# Patient Record
Sex: MALE | Race: BLACK OR AFRICAN AMERICAN
[De-identification: names, ages, dates, MRNs, and addresses within clinical notes are randomized per-mention and may not be internally consistent; named-entity substitution may affect disease eponyms.]

---

## 2017-01-20 ENCOUNTER — PRIOR ORIGINAL RECORDS (OUTPATIENT)
Dept: OTHER | Age: 57
End: 2017-01-20

## 2017-08-28 ENCOUNTER — PRIOR ORIGINAL RECORDS (OUTPATIENT)
Dept: OTHER | Age: 57
End: 2017-08-28

## 2017-09-05 LAB
ALKALINE PHOSPHATATE(ALK PHOS): 63 IU/L
BILIRUBIN TOTAL: 0.9 MG/DL
BUN: 10 MG/DL
CALCIUM: 9.8 MG/DL
CHLORIDE: 105 MEQ/L
CHOLESTEROL, TOTAL: 177 MG/DL
CREATININE, SERUM: 1 MG/DL
GLUCOSE: 105 MG/DL
HDL CHOLESTEROL: 42 MG/DL
HEMATOCRIT: 39.9 %
HEMOGLOBIN A1C: 5.6 %
HEMOGLOBIN: 13.7 G/DL
LDL CHOLESTEROL: 117 MG/DL
PLATELETS: 236 K/UL
POTASSIUM, SERUM: 4.7 MEQ/L
PROTEIN, TOTAL: 6.6 G/DL
RED BLOOD COUNT: 4.4 X 10-6/U
SGOT (AST): 25 IU/L
SGPT (ALT): 20 IU/L
SODIUM: 143 MEQ/L
THYROID STIMULATING HORMONE: 1.25 MLU/L
TRIGLYCERIDES: 92 MG/DL
WHITE BLOOD COUNT: 6.9 X 10-3/U

## 2017-12-07 ENCOUNTER — PRIOR ORIGINAL RECORDS (OUTPATIENT)
Dept: OTHER | Age: 57
End: 2017-12-07

## 2018-12-13 ENCOUNTER — MYAURORA ACCOUNT LINK (OUTPATIENT)
Dept: OTHER | Age: 58
End: 2018-12-13

## 2018-12-13 ENCOUNTER — PRIOR ORIGINAL RECORDS (OUTPATIENT)
Dept: OTHER | Age: 58
End: 2018-12-13

## 2019-02-12 ENCOUNTER — PRIOR ORIGINAL RECORDS (OUTPATIENT)
Dept: OTHER | Age: 59
End: 2019-02-12

## 2019-02-12 ENCOUNTER — HOSPITAL ENCOUNTER (OUTPATIENT)
Dept: CT IMAGING | Age: 59
Discharge: HOME | End: 2019-02-12
Attending: INTERNAL MEDICINE

## 2019-02-12 DIAGNOSIS — Z13.9 ENCOUNTER FOR SCREENING: ICD-10-CM

## 2019-02-15 ENCOUNTER — PRIOR ORIGINAL RECORDS (OUTPATIENT)
Dept: OTHER | Age: 59
End: 2019-02-15

## 2019-02-15 LAB — UFCT: 0 CA SCORE

## 2019-02-28 VITALS
BODY MASS INDEX: 27.7 KG/M2 | HEART RATE: 84 BPM | SYSTOLIC BLOOD PRESSURE: 128 MMHG | HEIGHT: 73 IN | DIASTOLIC BLOOD PRESSURE: 70 MMHG | WEIGHT: 209 LBS

## 2019-02-28 VITALS
DIASTOLIC BLOOD PRESSURE: 80 MMHG | HEIGHT: 73 IN | HEART RATE: 95 BPM | BODY MASS INDEX: 27.57 KG/M2 | WEIGHT: 208 LBS | SYSTOLIC BLOOD PRESSURE: 130 MMHG

## 2019-03-05 ENCOUNTER — OFFICE VISIT (OUTPATIENT)
Dept: FAMILY MEDICINE CLINIC | Facility: CLINIC | Age: 59
End: 2019-03-05
Payer: COMMERCIAL

## 2019-03-05 VITALS
WEIGHT: 206 LBS | DIASTOLIC BLOOD PRESSURE: 80 MMHG | HEIGHT: 72 IN | SYSTOLIC BLOOD PRESSURE: 122 MMHG | OXYGEN SATURATION: 98 % | BODY MASS INDEX: 27.9 KG/M2 | HEART RATE: 102 BPM | TEMPERATURE: 98 F | RESPIRATION RATE: 20 BRPM

## 2019-03-05 DIAGNOSIS — J01.40 ACUTE NON-RECURRENT PANSINUSITIS: Primary | ICD-10-CM

## 2019-03-05 DIAGNOSIS — H66.93 ACUTE BILATERAL OTITIS MEDIA: ICD-10-CM

## 2019-03-05 PROCEDURE — 99202 OFFICE O/P NEW SF 15 MIN: CPT | Performed by: NURSE PRACTITIONER

## 2019-03-05 RX ORDER — FLUTICASONE PROPIONATE 50 MCG
2 SPRAY, SUSPENSION (ML) NASAL DAILY
Qty: 1 BOTTLE | Refills: 0 | Status: SHIPPED | OUTPATIENT
Start: 2019-03-05 | End: 2019-03-19

## 2019-03-05 RX ORDER — AMOXICILLIN AND CLAVULANATE POTASSIUM 875; 125 MG/1; MG/1
1 TABLET, FILM COATED ORAL 2 TIMES DAILY
Qty: 20 TABLET | Refills: 0 | Status: SHIPPED | OUTPATIENT
Start: 2019-03-05 | End: 2019-03-15

## 2019-03-05 NOTE — PATIENT INSTRUCTIONS
-   Increase oral fluids to loosen and thin secretions, eat a nutritious diet  -   Tylenol or ibuprofen for pain as packet insert   -   Robitussin DM, Mucinex DM, or generic equivalent for cough as packet insert  -   Return to clinic if symptoms persist or can also happen due to allergies to pollens and other particles in the air. Sinusitis can cause symptoms of sinus congestion and a feeling of fullness. A sinus infection causes fever, headache, and facial pain.  There is often green or yellow fluid draining medicine was prescribed to you. If you have chronic liver or kidney disease or ever had a stomach ulcer, talk with your healthcare provider before using these medicines. (Aspirin should never be taken by anyone under age 25 who is ill with a fever.  It may treatment. Home care  The following are general care guidelines:  · Finish all of the antibiotic medicine given, even though you may feel better after the first few days.   · You may use over-the-counter medicine, such as acetaminophen or ibuprofen, to c

## 2019-03-05 NOTE — PROGRESS NOTES
CHIEF COMPLAINT:   Patient presents with:  Chest Congestion: post nasal drainage, coughing up flem, bodyaches, headache, sinus pressure, swollen glands mainly on left side, X  4 days      HPI:   Michelle Medina is a 62year old male who presents for sinus co NEURO: + sinus headaches. No numbness or tingling in face.     EXAM:   /80   Pulse 102   Temp 98.2 °F (36.8 °C) (Oral)   Resp 20   Ht 72\"   Wt 206 lb   SpO2 98%   BMI 27.94 kg/m²   GENERAL: well developed, well nourished,in no apparent distress  SKI -   Tylenol or ibuprofen for pain as packet insert   -   Robitussin DM, Mucinex DM, or generic equivalent for cough as packet insert  -   Return to clinic if symptoms persist or worsen in the next 3 days  -   Flonase for nasal symptoms as packet insert The sinuses are air-filled spaces within the bones of the face. They connect to the inside of the nose. Sinusitis is an inflammation of the tissue that lines the sinuses. Sinusitis can occur during a cold.  It can also happen due to allergies to pollens and · Do not use nasal rinses or irrigation during an acute sinus infection, unless your healthcare provider tells you to. Rinsing may spread the infection to other areas in your sinuses.   · Use acetaminophen or ibuprofen to control pain, unless another pain m You have an infection of the middle ear, the space behind the eardrum. This is also called acute otitis media (AOM). Sometimes it is caused by the common cold.  This is because congestion can block the internal passage (eustachian tube) that drains fluid fr The patient indicates understanding of these issues and agrees to the plan.

## 2019-04-19 RX ORDER — LOSARTAN POTASSIUM 50 MG/1
TABLET ORAL
COMMUNITY
End: 2019-07-19 | Stop reason: SDUPTHER

## 2019-06-11 ENCOUNTER — HOSPITAL ENCOUNTER (OUTPATIENT)
Dept: CARDIOLOGY CLINIC | Facility: HOSPITAL | Age: 59
Discharge: HOME | End: 2019-06-11
Attending: INTERNAL MEDICINE

## 2019-06-11 DIAGNOSIS — Z13.9 ENCOUNTER FOR SCREENING: ICD-10-CM

## 2019-06-12 ENCOUNTER — OFFICE VISIT (OUTPATIENT)
Dept: FAMILY MEDICINE CLINIC | Facility: CLINIC | Age: 59
End: 2019-06-12
Payer: COMMERCIAL

## 2019-06-12 VITALS
SYSTOLIC BLOOD PRESSURE: 170 MMHG | DIASTOLIC BLOOD PRESSURE: 98 MMHG | HEIGHT: 71 IN | WEIGHT: 197 LBS | TEMPERATURE: 98 F | OXYGEN SATURATION: 98 % | BODY MASS INDEX: 27.58 KG/M2 | RESPIRATION RATE: 15 BRPM | HEART RATE: 62 BPM

## 2019-06-12 DIAGNOSIS — Z23 NEED FOR TDAP VACCINATION: ICD-10-CM

## 2019-06-12 DIAGNOSIS — Z00.00 ENCOUNTER FOR ANNUAL PHYSICAL EXAM: Primary | ICD-10-CM

## 2019-06-12 DIAGNOSIS — Z13.228 SCREENING FOR ENDOCRINE, NUTRITIONAL, METABOLIC AND IMMUNITY DISORDER: ICD-10-CM

## 2019-06-12 DIAGNOSIS — Z13.0 SCREENING FOR ENDOCRINE, NUTRITIONAL, METABOLIC AND IMMUNITY DISORDER: ICD-10-CM

## 2019-06-12 DIAGNOSIS — Z13.29 SCREENING FOR ENDOCRINE, NUTRITIONAL, METABOLIC AND IMMUNITY DISORDER: ICD-10-CM

## 2019-06-12 DIAGNOSIS — I10 HYPERTENSION, UNCONTROLLED: ICD-10-CM

## 2019-06-12 DIAGNOSIS — Z13.21 SCREENING FOR ENDOCRINE, NUTRITIONAL, METABOLIC AND IMMUNITY DISORDER: ICD-10-CM

## 2019-06-12 DIAGNOSIS — Z12.5 PROSTATE CANCER SCREENING: ICD-10-CM

## 2019-06-12 DIAGNOSIS — Z23 NEED FOR PNEUMOCOCCAL VACCINATION: ICD-10-CM

## 2019-06-12 DIAGNOSIS — L30.9 ECZEMA, UNSPECIFIED TYPE: ICD-10-CM

## 2019-06-12 PROCEDURE — 99396 PREV VISIT EST AGE 40-64: CPT | Performed by: EMERGENCY MEDICINE

## 2019-06-12 PROCEDURE — 90471 IMMUNIZATION ADMIN: CPT | Performed by: EMERGENCY MEDICINE

## 2019-06-12 PROCEDURE — 90732 PPSV23 VACC 2 YRS+ SUBQ/IM: CPT | Performed by: EMERGENCY MEDICINE

## 2019-06-12 PROCEDURE — 90715 TDAP VACCINE 7 YRS/> IM: CPT | Performed by: EMERGENCY MEDICINE

## 2019-06-12 PROCEDURE — 99212 OFFICE O/P EST SF 10 MIN: CPT | Performed by: EMERGENCY MEDICINE

## 2019-06-12 PROCEDURE — 90472 IMMUNIZATION ADMIN EACH ADD: CPT | Performed by: EMERGENCY MEDICINE

## 2019-06-12 RX ORDER — LOSARTAN POTASSIUM 50 MG/1
TABLET ORAL
Refills: 1 | COMMUNITY
Start: 2019-04-21 | End: 2019-06-12

## 2019-06-12 RX ORDER — LOSARTAN POTASSIUM AND HYDROCHLOROTHIAZIDE 25; 100 MG/1; MG/1
1 TABLET ORAL DAILY
Qty: 30 TABLET | Refills: 11 | Status: SHIPPED | OUTPATIENT
Start: 2019-06-12 | End: 2019-06-13 | Stop reason: RX

## 2019-06-12 RX ORDER — TRIAMCINOLONE ACETONIDE 5 MG/G
1 CREAM TOPICAL 2 TIMES DAILY
Qty: 30 G | Refills: 1 | Status: SHIPPED | OUTPATIENT
Start: 2019-06-12 | End: 2019-08-11

## 2019-06-12 NOTE — PATIENT INSTRUCTIONS
Thank you for choosing [facility]  To Do:  FOR PUJA BRADLEY    · Home BP monitoring at home  · Low salt diet  · Stop plain losartan, start combination losartan- hydrochlorothiazide  · Follow up in 2-3 weeks for BP check and rash  · Arrange for c years; yearly fecal occult blood test or fecal immunochemical test; or a stool DNA test as often as your healthcare provider advises; talk with your healthcare provider about which tests are best for you   Depression All men in this age group At routine ex provider 3 doses over 6 months; second dose should be given 1 month after the first dose; the third dose should be given at least 2 months after the second dose and at least 4 months after the first dose   Haemophilus influenzae Type B (HIB) Men at [facility] professional medical care. Always follow your healthcare professional's instructions. What is Atopic Dermatitis? Atopic dermatitis (also called eczema) causes chronic skin irritation. It is often found in infants, teens, and adults.  This diseas [address]. [address] [facility], [address] [place] [address]. All rights reserved. This information is not intended as a substitute for professional medical care. Always follow your healthcare professional's instructions.         Managing Atopic Dermatitis (Ecze stress in your life. · Wear loose-fitting cotton clothing that does not bind or rub your skin. · Avoid contact with wool or other scratchy fabrics. · Use fragrance-free products.   Getting good results  Now that you know more about atopic dermatitis, the

## 2019-06-12 NOTE — PROGRESS NOTES
Chief Complaint:   Patient presents with:  Physical: NP, annual physical. C/o rash on hands and underarm    HPI:   This is a 62year old male who present for a yearly annual exam    WELL-MALE EXAM     1. Age:   62   2.   Have you had any of the following Performed by Hardeep Silveira MD at [address]  N/A 7/17/2014    Performed by Hardeep Silveira MD at [address]  N/A 6/26/2014    Performed by Hardeep Silveira MD at [place] [address] [employer] BS. No palpable masses no guarding rigidity no rebound tenderness    : bilateral testicles descended, circumcised no evidence for hernia bilaterally with Valsalva maneuver no penile discharge no rash no inguinal lymphadenopathy.  Sc vaccination  - TETANUS, DIPHTHERIA TOXOIDS AND ACELLULAR PERTUSIS VACCINE (TDAP), >7 YEARS, IM USE    7.  Need for pneumococcal vaccination  - PNEUMOCOCCAL IMM (PNEUMOVAX)          PATIENT INSTRUCTIONS:     · Home BP monitoring at home  · Low salt diet  · S

## 2019-06-13 ENCOUNTER — TELEPHONE (OUTPATIENT)
Dept: FAMILY MEDICINE CLINIC | Facility: CLINIC | Age: 59
End: 2019-06-13

## 2019-06-13 ENCOUNTER — LAB ENCOUNTER (OUTPATIENT)
Dept: LAB | Age: 59
End: 2019-06-13
Attending: EMERGENCY MEDICINE
Payer: COMMERCIAL

## 2019-06-13 DIAGNOSIS — Z13.29 SCREENING FOR ENDOCRINE, NUTRITIONAL, METABOLIC AND IMMUNITY DISORDER: ICD-10-CM

## 2019-06-13 DIAGNOSIS — Z13.228 SCREENING FOR ENDOCRINE, NUTRITIONAL, METABOLIC AND IMMUNITY DISORDER: ICD-10-CM

## 2019-06-13 DIAGNOSIS — Z12.5 PROSTATE CANCER SCREENING: ICD-10-CM

## 2019-06-13 DIAGNOSIS — Z13.0 SCREENING FOR ENDOCRINE, NUTRITIONAL, METABOLIC AND IMMUNITY DISORDER: ICD-10-CM

## 2019-06-13 DIAGNOSIS — Z13.21 SCREENING FOR ENDOCRINE, NUTRITIONAL, METABOLIC AND IMMUNITY DISORDER: ICD-10-CM

## 2019-06-13 DIAGNOSIS — I10 HYPERTENSION, UNCONTROLLED: ICD-10-CM

## 2019-06-13 PROCEDURE — 80050 GENERAL HEALTH PANEL: CPT | Performed by: EMERGENCY MEDICINE

## 2019-06-13 PROCEDURE — 36415 COLL VENOUS BLD VENIPUNCTURE: CPT | Performed by: EMERGENCY MEDICINE

## 2019-06-13 PROCEDURE — 84153 ASSAY OF PSA TOTAL: CPT | Performed by: EMERGENCY MEDICINE

## 2019-06-13 PROCEDURE — 80061 LIPID PANEL: CPT | Performed by: EMERGENCY MEDICINE

## 2019-06-13 RX ORDER — LOSARTAN POTASSIUM 100 MG/1
100 TABLET ORAL DAILY
Qty: 30 TABLET | Refills: 11 | Status: SHIPPED | OUTPATIENT
Start: 2019-06-13 | End: 2020-06-05

## 2019-06-13 RX ORDER — HYDROCHLOROTHIAZIDE 25 MG/1
25 TABLET ORAL DAILY
Qty: 30 TABLET | Refills: 11 | Status: SHIPPED | OUTPATIENT
Start: 2019-06-13 | End: 2020-06-05

## 2019-06-13 NOTE — TELEPHONE ENCOUNTER
Losartan-HCTZ is on backorder. Medication is available if it is spilt. Okay to send individually? Please advise. Thank you!

## 2019-06-18 ENCOUNTER — TELEPHONE (OUTPATIENT)
Dept: CARDIOLOGY | Age: 59
End: 2019-06-18

## 2019-06-19 ENCOUNTER — TELEPHONE (OUTPATIENT)
Dept: FAMILY MEDICINE CLINIC | Facility: CLINIC | Age: 59
End: 2019-06-19

## 2019-06-19 NOTE — TELEPHONE ENCOUNTER
----- Message from Roel Cruz MD sent at 6/18/2019  6:33 PM CDT -----  Stable labs  Follow up as directed

## 2019-06-21 ENCOUNTER — TELEPHONE (OUTPATIENT)
Dept: CARDIOLOGY | Age: 59
End: 2019-06-21

## 2019-06-21 DIAGNOSIS — I70.0 ATHEROSCLEROSIS OF AORTA (CMD): Primary | ICD-10-CM

## 2019-06-21 DIAGNOSIS — E78.00 HYPERCHOLESTEREMIA: ICD-10-CM

## 2019-06-21 RX ORDER — ATORVASTATIN CALCIUM 10 MG/1
10 TABLET, FILM COATED ORAL DAILY
COMMUNITY
End: 2019-06-21 | Stop reason: SDUPTHER

## 2019-06-21 RX ORDER — ATORVASTATIN CALCIUM 10 MG/1
10 TABLET, FILM COATED ORAL DAILY
Qty: 90 TABLET | Refills: 1 | Status: SHIPPED | OUTPATIENT
Start: 2019-06-21 | End: 2020-02-01 | Stop reason: SDUPTHER

## 2019-06-24 PROBLEM — L30.9 ECZEMA: Status: ACTIVE | Noted: 2019-06-24

## 2019-06-24 PROBLEM — I10 HYPERTENSION, UNCONTROLLED: Status: ACTIVE | Noted: 2019-06-24

## 2019-07-19 RX ORDER — LOSARTAN POTASSIUM 50 MG/1
TABLET ORAL
Qty: 90 TABLET | Refills: 1 | Status: SHIPPED | OUTPATIENT
Start: 2019-07-19 | End: 2019-12-03 | Stop reason: DRUGHIGH

## 2019-08-11 DIAGNOSIS — L30.9 ECZEMA, UNSPECIFIED TYPE: ICD-10-CM

## 2019-08-12 RX ORDER — TRIAMCINOLONE ACETONIDE 5 MG/G
CREAM TOPICAL
Qty: 30 G | Refills: 0 | Status: SHIPPED | OUTPATIENT
Start: 2019-08-12 | End: 2020-01-02

## 2019-08-12 NOTE — TELEPHONE ENCOUNTER
Medication(s) to Refill:   Requested Prescriptions     Pending Prescriptions Disp Refills   • TRIAMCINOLONE ACETONIDE 0.5 % External Cream [Pharmacy Med Name: TRIAMCINOLONE 0.5% CREAM 15GM] 30 g 0     Sig: APPLY EXTERNALLY TO THE AFFECTED AREA TWICE DAILY.

## 2019-10-06 DIAGNOSIS — L30.9 ECZEMA, UNSPECIFIED TYPE: ICD-10-CM

## 2019-10-07 RX ORDER — TRIAMCINOLONE ACETONIDE 5 MG/G
CREAM TOPICAL
Qty: 30 G | Refills: 0 | Status: SHIPPED | OUTPATIENT
Start: 2019-10-07 | End: 2019-12-06

## 2019-10-23 ENCOUNTER — TELEPHONE (OUTPATIENT)
Dept: CARDIOLOGY | Age: 59
End: 2019-10-23

## 2019-12-05 ENCOUNTER — OFFICE VISIT (OUTPATIENT)
Dept: CARDIOLOGY | Age: 59
End: 2019-12-05

## 2019-12-05 VITALS
HEART RATE: 88 BPM | DIASTOLIC BLOOD PRESSURE: 60 MMHG | BODY MASS INDEX: 27.3 KG/M2 | WEIGHT: 206 LBS | HEIGHT: 73 IN | SYSTOLIC BLOOD PRESSURE: 110 MMHG

## 2019-12-05 DIAGNOSIS — I10 ESSENTIAL HYPERTENSION: ICD-10-CM

## 2019-12-05 DIAGNOSIS — Z71.6 TOBACCO ABUSE COUNSELING: ICD-10-CM

## 2019-12-05 DIAGNOSIS — E78.2 MIXED HYPERLIPIDEMIA: Primary | ICD-10-CM

## 2019-12-05 DIAGNOSIS — Z72.0 TOBACCO ABUSE: ICD-10-CM

## 2019-12-05 PROCEDURE — 3074F SYST BP LT 130 MM HG: CPT | Performed by: INTERNAL MEDICINE

## 2019-12-05 PROCEDURE — 3078F DIAST BP <80 MM HG: CPT | Performed by: INTERNAL MEDICINE

## 2019-12-05 PROCEDURE — 99214 OFFICE O/P EST MOD 30 MIN: CPT | Performed by: INTERNAL MEDICINE

## 2019-12-05 RX ORDER — HYDROCHLOROTHIAZIDE 25 MG/1
25 TABLET ORAL DAILY
Refills: 11 | COMMUNITY
Start: 2019-11-09

## 2019-12-05 RX ORDER — TRIAMCINOLONE ACETONIDE 5 MG/G
CREAM TOPICAL
Refills: 0 | COMMUNITY
Start: 2019-10-07 | End: 2022-03-11 | Stop reason: ALTCHOICE

## 2019-12-05 RX ORDER — MULTIVITAMIN
TABLET ORAL
COMMUNITY

## 2019-12-05 RX ORDER — LOSARTAN POTASSIUM 100 MG/1
100 TABLET ORAL DAILY
COMMUNITY
End: 2020-12-22 | Stop reason: SDUPTHER

## 2019-12-05 ASSESSMENT — PATIENT HEALTH QUESTIONNAIRE - PHQ9
SUM OF ALL RESPONSES TO PHQ9 QUESTIONS 1 AND 2: 0
1. LITTLE INTEREST OR PLEASURE IN DOING THINGS: NOT AT ALL
2. FEELING DOWN, DEPRESSED OR HOPELESS: NOT AT ALL
SUM OF ALL RESPONSES TO PHQ9 QUESTIONS 1 AND 2: 0

## 2019-12-05 ASSESSMENT — ENCOUNTER SYMPTOMS
HEMATOCHEZIA: 0
ALLERGIC/IMMUNOLOGIC COMMENTS: NO NEW FOOD ALLERGIES
FEVER: 0
BRUISES/BLEEDS EASILY: 0
CHILLS: 0
WEIGHT LOSS: 0
SUSPICIOUS LESIONS: 0
HEMOPTYSIS: 0
COUGH: 0
WEIGHT GAIN: 0

## 2019-12-06 DIAGNOSIS — L30.9 ECZEMA, UNSPECIFIED TYPE: ICD-10-CM

## 2019-12-07 RX ORDER — TRIAMCINOLONE ACETONIDE 5 MG/G
CREAM TOPICAL
Qty: 30 G | Refills: 0 | Status: SHIPPED | OUTPATIENT
Start: 2019-12-07 | End: 2020-01-02

## 2019-12-23 PROBLEM — Z71.6 TOBACCO ABUSE COUNSELING: Status: ACTIVE | Noted: 2019-12-05

## 2019-12-23 PROBLEM — E78.2 MIXED HYPERLIPIDEMIA: Status: ACTIVE | Noted: 2019-12-05

## 2019-12-23 PROBLEM — I10 ESSENTIAL HYPERTENSION: Status: ACTIVE | Noted: 2019-06-24

## 2019-12-23 PROBLEM — Z72.0 TOBACCO ABUSE: Status: ACTIVE | Noted: 2019-12-05

## 2020-01-02 ENCOUNTER — HOSPITAL ENCOUNTER (OUTPATIENT)
Dept: GENERAL RADIOLOGY | Age: 60
Discharge: HOME | End: 2020-01-02
Attending: EMERGENCY MEDICINE
Payer: COMMERCIAL

## 2020-01-02 ENCOUNTER — LAB ENCOUNTER (OUTPATIENT)
Dept: LAB | Age: 60
End: 2020-01-02
Attending: EMERGENCY MEDICINE
Payer: COMMERCIAL

## 2020-01-02 ENCOUNTER — OFFICE VISIT (OUTPATIENT)
Dept: FAMILY MEDICINE CLINIC | Facility: CLINIC | Age: 60
End: 2020-01-02
Payer: COMMERCIAL

## 2020-01-02 VITALS
HEIGHT: 71 IN | BODY MASS INDEX: 28.98 KG/M2 | RESPIRATION RATE: 15 BRPM | DIASTOLIC BLOOD PRESSURE: 78 MMHG | OXYGEN SATURATION: 97 % | HEART RATE: 103 BPM | SYSTOLIC BLOOD PRESSURE: 110 MMHG | WEIGHT: 207 LBS

## 2020-01-02 DIAGNOSIS — Z23 NEED FOR VACCINATION: ICD-10-CM

## 2020-01-02 DIAGNOSIS — J30.9 ALLERGIC RHINITIS, UNSPECIFIED SEASONALITY, UNSPECIFIED TRIGGER: ICD-10-CM

## 2020-01-02 DIAGNOSIS — R06.02 SHORTNESS OF BREATH: ICD-10-CM

## 2020-01-02 DIAGNOSIS — L30.9 ECZEMA, UNSPECIFIED TYPE: ICD-10-CM

## 2020-01-02 DIAGNOSIS — I10 ESSENTIAL HYPERTENSION: Primary | ICD-10-CM

## 2020-01-02 DIAGNOSIS — H10.13 ALLERGIC CONJUNCTIVITIS OF BOTH EYES: ICD-10-CM

## 2020-01-02 LAB
ANION GAP SERPL CALC-SCNC: 3 MMOL/L (ref 0–18)
BASOPHILS # BLD AUTO: 0.03 X10(3) UL (ref 0–0.2)
BASOPHILS NFR BLD AUTO: 0.3 %
BUN BLD-MCNC: 14 MG/DL (ref 7–18)
BUN/CREAT SERPL: 11.5 (ref 10–20)
CALCIUM BLD-MCNC: 9.3 MG/DL (ref 8.5–10.1)
CHLORIDE SERPL-SCNC: 109 MMOL/L (ref 98–112)
CO2 SERPL-SCNC: 30 MMOL/L (ref 21–32)
CREAT BLD-MCNC: 1.22 MG/DL (ref 0.7–1.3)
DEPRECATED RDW RBC AUTO: 41.7 FL (ref 35.1–46.3)
EOSINOPHIL # BLD AUTO: 0.3 X10(3) UL (ref 0–0.7)
EOSINOPHIL NFR BLD AUTO: 3 %
ERYTHROCYTE [DISTWIDTH] IN BLOOD BY AUTOMATED COUNT: 12.6 % (ref 11–15)
GLUCOSE BLD-MCNC: 92 MG/DL (ref 70–99)
HCT VFR BLD AUTO: 41.4 % (ref 39–53)
HGB BLD-MCNC: 13.7 G/DL (ref 13–17.5)
IMM GRANULOCYTES # BLD AUTO: 0.04 X10(3) UL (ref 0–1)
IMM GRANULOCYTES NFR BLD: 0.4 %
LYMPHOCYTES # BLD AUTO: 2.08 X10(3) UL (ref 1–4)
LYMPHOCYTES NFR BLD AUTO: 21.1 %
MCH RBC QN AUTO: 30.5 PG (ref 26–34)
MCHC RBC AUTO-ENTMCNC: 33.1 G/DL (ref 31–37)
MCV RBC AUTO: 92.2 FL (ref 80–100)
MONOCYTES # BLD AUTO: 0.67 X10(3) UL (ref 0.1–1)
MONOCYTES NFR BLD AUTO: 6.8 %
NEUTROPHILS # BLD AUTO: 6.75 X10 (3) UL (ref 1.5–7.7)
NEUTROPHILS # BLD AUTO: 6.75 X10(3) UL (ref 1.5–7.7)
NEUTROPHILS NFR BLD AUTO: 68.4 %
OSMOLALITY SERPL CALC.SUM OF ELEC: 294 MOSM/KG (ref 275–295)
PATIENT FASTING Y/N/NP: YES
PLATELET # BLD AUTO: 271 10(3)UL (ref 150–450)
POTASSIUM SERPL-SCNC: 3.9 MMOL/L (ref 3.5–5.1)
RBC # BLD AUTO: 4.49 X10(6)UL (ref 4.3–5.7)
SODIUM SERPL-SCNC: 142 MMOL/L (ref 136–145)
WBC # BLD AUTO: 9.9 X10(3) UL (ref 4–11)

## 2020-01-02 PROCEDURE — 90686 IIV4 VACC NO PRSV 0.5 ML IM: CPT | Performed by: EMERGENCY MEDICINE

## 2020-01-02 PROCEDURE — 71046 X-RAY EXAM CHEST 2 VIEWS: CPT | Performed by: EMERGENCY MEDICINE

## 2020-01-02 PROCEDURE — 90471 IMMUNIZATION ADMIN: CPT | Performed by: EMERGENCY MEDICINE

## 2020-01-02 PROCEDURE — 80048 BASIC METABOLIC PNL TOTAL CA: CPT | Performed by: EMERGENCY MEDICINE

## 2020-01-02 PROCEDURE — 93000 ELECTROCARDIOGRAM COMPLETE: CPT | Performed by: EMERGENCY MEDICINE

## 2020-01-02 PROCEDURE — 36415 COLL VENOUS BLD VENIPUNCTURE: CPT | Performed by: EMERGENCY MEDICINE

## 2020-01-02 PROCEDURE — 99214 OFFICE O/P EST MOD 30 MIN: CPT | Performed by: EMERGENCY MEDICINE

## 2020-01-02 PROCEDURE — 85025 COMPLETE CBC W/AUTO DIFF WBC: CPT | Performed by: EMERGENCY MEDICINE

## 2020-01-02 RX ORDER — ATORVASTATIN CALCIUM 10 MG/1
10 TABLET, FILM COATED ORAL
COMMUNITY
Start: 2019-06-21

## 2020-01-02 RX ORDER — FLUTICASONE PROPIONATE 50 MCG
2 SPRAY, SUSPENSION (ML) NASAL DAILY
Qty: 16 G | Refills: 2 | Status: SHIPPED | OUTPATIENT
Start: 2020-01-02 | End: 2020-02-01

## 2020-01-02 RX ORDER — TRIAMCINOLONE ACETONIDE 5 MG/G
CREAM TOPICAL
Qty: 30 G | Refills: 2 | Status: SHIPPED | OUTPATIENT
Start: 2020-01-02 | End: 2020-09-08

## 2020-01-02 RX ORDER — OLOPATADINE HYDROCHLORIDE 1 MG/ML
1 SOLUTION/ DROPS OPHTHALMIC 2 TIMES DAILY
Qty: 5 ML | Refills: 1 | Status: SHIPPED | OUTPATIENT
Start: 2020-01-02

## 2020-01-02 NOTE — PATIENT INSTRUCTIONS
Thank you for choosing [facility]  To Do:  FOR PUJA BRADLEY    · Continue with BP meds  · Follow up in 6 months, June for annual physical  · BP checks at home 2-3 x a week  · Continue with Triamcinolone cream as needed  · Use flonase for nasal a

## 2020-01-02 NOTE — PROGRESS NOTES
Chief Complaint:   Patient presents with:  Blood Pressure: F/u     HPI:   This is a 62year old male     HYPERTENSION  On Losartan 100 mg/ HCTZ 25. No Cough.  Compliant with med  Also on Atorvastatin 10 mg daily      SMOKER  Still smoking  3-4 a day for m Disp: 30 tablet, Rfl: 11  hydrochlorothiazide 25 MG Oral Tab, Take 1 tablet (25 mg total) by mouth daily. , Disp: 30 tablet, Rfl: 11  Multiple Vitamin (MULTI-VITAMIN DAILY) Oral Tab, Take by mouth., Disp: , Rfl:     No current facility-administered medicati 1. Essential hypertension  Well-controlled today okay to continue with combination losartan and hydrochlorothiazide. Patient complains of nonspecific symptoms of shortness of breath with standing up from a bending position.   Will check stress test

## 2020-02-04 RX ORDER — ATORVASTATIN CALCIUM 10 MG/1
10 TABLET, FILM COATED ORAL DAILY
Qty: 90 TABLET | Refills: 3 | Status: SHIPPED | OUTPATIENT
Start: 2020-02-04 | End: 2021-02-05 | Stop reason: SDUPTHER

## 2020-02-14 ENCOUNTER — HOSPITAL ENCOUNTER (OUTPATIENT)
Dept: CV DIAGNOSTICS | Age: 60
Discharge: HOME | End: 2020-02-14
Attending: EMERGENCY MEDICINE
Payer: COMMERCIAL

## 2020-02-14 DIAGNOSIS — I10 ESSENTIAL HYPERTENSION: ICD-10-CM

## 2020-02-14 DIAGNOSIS — R06.02 SHORTNESS OF BREATH: ICD-10-CM

## 2020-02-14 PROCEDURE — 93017 CV STRESS TEST TRACING ONLY: CPT | Performed by: EMERGENCY MEDICINE

## 2020-02-14 PROCEDURE — 93018 CV STRESS TEST I&R ONLY: CPT | Performed by: EMERGENCY MEDICINE

## 2020-02-17 ENCOUNTER — TELEPHONE (OUTPATIENT)
Dept: FAMILY MEDICINE CLINIC | Facility: CLINIC | Age: 60
End: 2020-02-17

## 2020-02-17 NOTE — TELEPHONE ENCOUNTER
CARD TREADMILL STRESS, ADULT   Notes recorded by Faiza Negrete MD on 2/16/2020 at 10:21 PM CST  Normal stress test  Pt needs OV if with persistent Sx

## 2020-06-05 ENCOUNTER — OFFICE VISIT (OUTPATIENT)
Dept: FAMILY MEDICINE CLINIC | Facility: CLINIC | Age: 60
End: 2020-06-05
Payer: COMMERCIAL

## 2020-06-05 VITALS
BODY MASS INDEX: 26.64 KG/M2 | HEIGHT: 73 IN | TEMPERATURE: 98 F | RESPIRATION RATE: 16 BRPM | SYSTOLIC BLOOD PRESSURE: 118 MMHG | DIASTOLIC BLOOD PRESSURE: 76 MMHG | HEART RATE: 85 BPM | WEIGHT: 201 LBS | OXYGEN SATURATION: 95 %

## 2020-06-05 DIAGNOSIS — Z13.0 SCREENING FOR ENDOCRINE, NUTRITIONAL, METABOLIC AND IMMUNITY DISORDER: ICD-10-CM

## 2020-06-05 DIAGNOSIS — E78.2 MIXED HYPERLIPIDEMIA: ICD-10-CM

## 2020-06-05 DIAGNOSIS — L30.9 ECZEMA, UNSPECIFIED TYPE: ICD-10-CM

## 2020-06-05 DIAGNOSIS — Z13.228 SCREENING FOR ENDOCRINE, NUTRITIONAL, METABOLIC AND IMMUNITY DISORDER: ICD-10-CM

## 2020-06-05 DIAGNOSIS — Z00.00 ANNUAL PHYSICAL EXAM: Primary | ICD-10-CM

## 2020-06-05 DIAGNOSIS — Z13.29 SCREENING FOR ENDOCRINE, NUTRITIONAL, METABOLIC AND IMMUNITY DISORDER: ICD-10-CM

## 2020-06-05 DIAGNOSIS — Z12.5 PROSTATE CANCER SCREENING: ICD-10-CM

## 2020-06-05 DIAGNOSIS — I10 ESSENTIAL HYPERTENSION: ICD-10-CM

## 2020-06-05 DIAGNOSIS — Z12.11 SCREENING FOR COLON CANCER: ICD-10-CM

## 2020-06-05 DIAGNOSIS — Z13.21 SCREENING FOR ENDOCRINE, NUTRITIONAL, METABOLIC AND IMMUNITY DISORDER: ICD-10-CM

## 2020-06-05 PROCEDURE — 99396 PREV VISIT EST AGE 40-64: CPT | Performed by: EMERGENCY MEDICINE

## 2020-06-05 RX ORDER — LOSARTAN POTASSIUM 100 MG/1
100 TABLET ORAL DAILY
Qty: 90 TABLET | Refills: 1 | Status: SHIPPED | OUTPATIENT
Start: 2020-06-05 | End: 2020-07-01

## 2020-06-05 RX ORDER — HYDROCHLOROTHIAZIDE 25 MG/1
25 TABLET ORAL DAILY
Qty: 90 TABLET | Refills: 1 | Status: SHIPPED | OUTPATIENT
Start: 2020-06-05 | End: 2021-04-26

## 2020-06-05 NOTE — PROGRESS NOTES
Chief Complaint:   Patient presents with: Follow - Up: BP and rash     HPI:   This is a 56year old male who present for a yearly annual exam    WELL-MALE EXAM       1. Age:              56   2.   Have you had any of the following problems:          a. Past Surgical History:   Procedure Laterality Date   • CERVICAL EPIDURAL N/A 8/7/2014    Performed by Erlinda Knight MD at [address] [facility] N/A 7/17/2014    Performed by Erlinda Knight MD at [address] [facility] distress  SKIN: good skin turgor, no obvious rashes  HEENT: atraumatic, normocephalic, ears, nose and throat are clear  EYES: sclera non icteric bilateral  NECK: supple, no adenopathy, no thyromegaly  LUNGS: clear to auscultation, no RRW  CARDIO: RRR witho healthy weight and healthy BMI  Immunizations reviewed and updated  TDAP up to date  The patient indicates understanding of these issues and agrees to the plan.   The patient is asked  to return yearly for annual preventative health exam.  Tetanus, Diptheri

## 2020-06-05 NOTE — PATIENT INSTRUCTIONS
Thank you for choosing [facility]  To Do:  FOR PUJA BRADLEY    · Arrange for colonoscopy, Dr. Marta Muniz  · Follow up in 6 months   · Flu shot in the fall  · Continue with all meds  · Monitor BP at home once a week  · Have blood tests done after fast stool DNA test as often as your healthcare provider advises; talk with your healthcare provider about which tests are best for you   Depression All men in this age group At routine exams   Type 2 diabetes or prediabetes All adults beginning at age 39 and a the first dose; the third dose should be given at least 2 months after the second dose and at least 4 months after the first dose   Haemophilus influenzae Type B (HIB) Men at increased risk for infection – talk with your healthcare provider 1 to 3 doses instructions.

## 2020-06-11 ENCOUNTER — LAB ENCOUNTER (OUTPATIENT)
Dept: LAB | Age: 60
End: 2020-06-11
Attending: EMERGENCY MEDICINE
Payer: COMMERCIAL

## 2020-06-11 DIAGNOSIS — Z13.21 SCREENING FOR ENDOCRINE, NUTRITIONAL, METABOLIC AND IMMUNITY DISORDER: ICD-10-CM

## 2020-06-11 DIAGNOSIS — Z13.29 SCREENING FOR ENDOCRINE, NUTRITIONAL, METABOLIC AND IMMUNITY DISORDER: ICD-10-CM

## 2020-06-11 DIAGNOSIS — Z12.5 PROSTATE CANCER SCREENING: ICD-10-CM

## 2020-06-11 DIAGNOSIS — Z13.0 SCREENING FOR ENDOCRINE, NUTRITIONAL, METABOLIC AND IMMUNITY DISORDER: ICD-10-CM

## 2020-06-11 DIAGNOSIS — Z13.228 SCREENING FOR ENDOCRINE, NUTRITIONAL, METABOLIC AND IMMUNITY DISORDER: ICD-10-CM

## 2020-06-11 PROCEDURE — 36415 COLL VENOUS BLD VENIPUNCTURE: CPT | Performed by: EMERGENCY MEDICINE

## 2020-06-11 PROCEDURE — 80061 LIPID PANEL: CPT | Performed by: EMERGENCY MEDICINE

## 2020-06-11 PROCEDURE — 80050 GENERAL HEALTH PANEL: CPT | Performed by: EMERGENCY MEDICINE

## 2020-06-11 PROCEDURE — 84153 ASSAY OF PSA TOTAL: CPT | Performed by: EMERGENCY MEDICINE

## 2020-07-01 DIAGNOSIS — I10 ESSENTIAL HYPERTENSION: ICD-10-CM

## 2020-07-01 RX ORDER — LOSARTAN POTASSIUM 100 MG/1
TABLET ORAL
Qty: 30 TABLET | Refills: 0 | Status: SHIPPED | OUTPATIENT
Start: 2020-07-01 | End: 2021-04-26

## 2020-08-10 ENCOUNTER — VIRTUAL PHONE E/M (OUTPATIENT)
Dept: FAMILY MEDICINE CLINIC | Facility: CLINIC | Age: 60
End: 2020-08-10
Payer: COMMERCIAL

## 2020-08-10 DIAGNOSIS — R21 RASH AND NONSPECIFIC SKIN ERUPTION: Primary | ICD-10-CM

## 2020-08-10 PROCEDURE — 99213 OFFICE O/P EST LOW 20 MIN: CPT | Performed by: EMERGENCY MEDICINE

## 2020-08-10 NOTE — PROGRESS NOTES
This is a telemedicine visit with live, interactive audio. Keith Winslow verbally consents to a Virtual/Telephone Check-In visit on 08/10/20.     Patient understands and accepts financial responsibility for any deductible, co-insurance and/or co-pay days consecutively 30 g 0   • LOSARTAN 100 MG Oral Tab TAKE 1 TABLET(100 MG) BY MOUTH DAILY 30 tablet 0   • hydrochlorothiazide 25 MG Oral Tab Take 1 tablet (25 mg total) by mouth daily. 90 tablet 1   • atorvastatin 10 MG Oral Tab Take 10 mg by mouth. adequate time. This billing visit was spent on reviewing labs, medications, radiology tests and decision making. Appropriate medical decision-making and tests are ordered as detailed in the plan of care below.        Rivka Lopez MD

## 2020-08-30 DIAGNOSIS — R21 RASH AND NONSPECIFIC SKIN ERUPTION: ICD-10-CM

## 2020-09-05 DIAGNOSIS — L30.9 ECZEMA, UNSPECIFIED TYPE: ICD-10-CM

## 2020-09-08 RX ORDER — TRIAMCINOLONE ACETONIDE 5 MG/G
CREAM TOPICAL
Qty: 30 G | Refills: 2 | Status: SHIPPED | OUTPATIENT
Start: 2020-09-08 | End: 2020-10-26 | Stop reason: ALTCHOICE

## 2020-09-08 NOTE — TELEPHONE ENCOUNTER
Medication(s) to Refill:   Requested Prescriptions     Pending Prescriptions Disp Refills   • triamcinolone acetonide 0.5 % External Cream [Pharmacy Med Name: TRIAMCINOLONE 0.5% CREAM 15GM] 30 g 2     Sig: APPLY EXTERNALLY TO THE AFFECTED AREA TWICE DAILY.

## 2020-09-12 ENCOUNTER — HOSPITAL ENCOUNTER (EMERGENCY)
Age: 60
Discharge: HOME | End: 2020-09-12
Attending: EMERGENCY MEDICINE
Payer: COMMERCIAL

## 2020-09-12 VITALS
DIASTOLIC BLOOD PRESSURE: 83 MMHG | HEART RATE: 64 BPM | BODY MASS INDEX: 27 KG/M2 | WEIGHT: 201.06 LBS | OXYGEN SATURATION: 96 % | SYSTOLIC BLOOD PRESSURE: 156 MMHG | RESPIRATION RATE: 16 BRPM | TEMPERATURE: 97 F

## 2020-09-12 DIAGNOSIS — H57.11 EYE PAIN, RIGHT: ICD-10-CM

## 2020-09-12 DIAGNOSIS — R51.9 HEADACHE DISORDER: Primary | ICD-10-CM

## 2020-09-12 LAB
ALBUMIN SERPL-MCNC: 3.7 G/DL (ref 3.4–5)
ALBUMIN/GLOB SERPL: 1.2 {RATIO} (ref 1–2)
ALP LIVER SERPL-CCNC: 70 U/L (ref 45–117)
ALT SERPL-CCNC: 22 U/L (ref 16–61)
ANION GAP SERPL CALC-SCNC: 6 MMOL/L (ref 0–18)
AST SERPL-CCNC: 20 U/L (ref 15–37)
BASOPHILS # BLD AUTO: 0.03 X10(3) UL (ref 0–0.2)
BASOPHILS NFR BLD AUTO: 0.4 %
BILIRUB SERPL-MCNC: 1.2 MG/DL (ref 0.1–2)
BUN BLD-MCNC: 12 MG/DL (ref 7–18)
BUN/CREAT SERPL: 12.9 (ref 10–20)
CALCIUM BLD-MCNC: 8.8 MG/DL (ref 8.5–10.1)
CHLORIDE SERPL-SCNC: 107 MMOL/L (ref 98–112)
CO2 SERPL-SCNC: 27 MMOL/L (ref 21–32)
CREAT BLD-MCNC: 0.93 MG/DL (ref 0.7–1.3)
CRP SERPL-MCNC: 0.3 MG/DL (ref ?–0.3)
DEPRECATED RDW RBC AUTO: 43.4 FL (ref 35.1–46.3)
EOSINOPHIL # BLD AUTO: 0.14 X10(3) UL (ref 0–0.7)
EOSINOPHIL NFR BLD AUTO: 1.7 %
ERYTHROCYTE [DISTWIDTH] IN BLOOD BY AUTOMATED COUNT: 12.9 % (ref 11–15)
GLOBULIN PLAS-MCNC: 3.2 G/DL (ref 2.8–4.4)
GLUCOSE BLD-MCNC: 112 MG/DL (ref 70–99)
HCT VFR BLD AUTO: 39.3 % (ref 39–53)
HGB BLD-MCNC: 13.2 G/DL (ref 13–17.5)
IMM GRANULOCYTES # BLD AUTO: 0.02 X10(3) UL (ref 0–1)
IMM GRANULOCYTES NFR BLD: 0.2 %
LYMPHOCYTES # BLD AUTO: 1.34 X10(3) UL (ref 1–4)
LYMPHOCYTES NFR BLD AUTO: 16 %
M PROTEIN MFR SERPL ELPH: 6.9 G/DL (ref 6.4–8.2)
MCH RBC QN AUTO: 30.7 PG (ref 26–34)
MCHC RBC AUTO-ENTMCNC: 33.6 G/DL (ref 31–37)
MCV RBC AUTO: 91.4 FL (ref 80–100)
MONOCYTES # BLD AUTO: 0.59 X10(3) UL (ref 0.1–1)
MONOCYTES NFR BLD AUTO: 7 %
NEUTROPHILS # BLD AUTO: 6.28 X10 (3) UL (ref 1.5–7.7)
NEUTROPHILS # BLD AUTO: 6.28 X10(3) UL (ref 1.5–7.7)
NEUTROPHILS NFR BLD AUTO: 74.7 %
OSMOLALITY SERPL CALC.SUM OF ELEC: 291 MOSM/KG (ref 275–295)
PLATELET # BLD AUTO: 217 10(3)UL (ref 150–450)
POTASSIUM SERPL-SCNC: 3.6 MMOL/L (ref 3.5–5.1)
RBC # BLD AUTO: 4.3 X10(6)UL (ref 4.3–5.7)
SED RATE-ML: 6 MM/HR (ref 0–12)
SODIUM SERPL-SCNC: 140 MMOL/L (ref 136–145)
WBC # BLD AUTO: 8.4 X10(3) UL (ref 4–11)

## 2020-09-12 PROCEDURE — 80053 COMPREHEN METABOLIC PANEL: CPT | Performed by: EMERGENCY MEDICINE

## 2020-09-12 PROCEDURE — 85025 COMPLETE CBC W/AUTO DIFF WBC: CPT | Performed by: EMERGENCY MEDICINE

## 2020-09-12 PROCEDURE — 93005 ELECTROCARDIOGRAM TRACING: CPT

## 2020-09-12 PROCEDURE — 99284 EMERGENCY DEPT VISIT MOD MDM: CPT

## 2020-09-12 PROCEDURE — 86140 C-REACTIVE PROTEIN: CPT | Performed by: EMERGENCY MEDICINE

## 2020-09-12 PROCEDURE — 85652 RBC SED RATE AUTOMATED: CPT | Performed by: EMERGENCY MEDICINE

## 2020-09-12 PROCEDURE — 93010 ELECTROCARDIOGRAM REPORT: CPT

## 2020-09-12 PROCEDURE — 36415 COLL VENOUS BLD VENIPUNCTURE: CPT

## 2020-09-12 NOTE — ED INITIAL ASSESSMENT (HPI)
States he had sudden onset of right orbital area pain and right sided headache 2 hrs ago with blurred vision to right eye. Took Excedrin and now  vision is normal and H/A now 5/10.

## 2020-09-12 NOTE — ED PROVIDER NOTES
Patient Seen in: [facility] [place]      History   Patient presents with:  Headache    Stated Complaint: right sided headache with pressure to right eye and blurred vision started at 1*    HPI    This is a 69-year-old male with pas use: Yes      Frequency: Never      Comment: occ    Drug use: No             Review of Systems    Positive for stated complaint: right sided headache with pressure to right eye and blurred vision started at 1*  Other systems are as noted in HPI.   Constitut -----------         ------                     CBC W/ DIFFERENTIAL[[id number]]                              Final result                 Please view results for these tests on the individual orders.    CBC W/ DIFFERENTIAL     EKG    Rate, intervals and axes

## 2020-09-12 NOTE — ED PROVIDER NOTES
Patient Seen in: [facility] In [place]      History   Patient presents with:  Headache    Stated Complaint: right sided headache with pressure to right eye and blurred vision started at 3    78-year-old -American male with a h started at 1*  Other systems are as noted in HPI. Constitutional and vital signs reviewed. All other systems reviewed and negative except as noted above.     Physical Exam     ED Triage Vitals [09/12/20 1346]   /83   Pulse 64   Resp 16   Temp 97 motion. Skin:     General: Skin is warm and dry. Findings: No lesion or rash. Neurological:      General: No focal deficit present. Mental Status: He is alert and oriented to person, place, and time.       Cranial Nerves: No cranial nerve defi MDM     Case was discussed with Dr. Nathaly Harkins of the [facility].  states that he would like a slit lamp exam performed to ensure that there were no cells in the anterior chamber.   Work-up was discussed with this doctor including lab

## 2020-09-13 LAB
ATRIAL RATE: 63 BPM
P AXIS: 57 DEGREES
P-R INTERVAL: 188 MS
Q-T INTERVAL: 388 MS
QRS DURATION: 90 MS
QTC CALCULATION (BEZET): 397 MS
R AXIS: -10 DEGREES
T AXIS: -22 DEGREES
VENTRICULAR RATE: 63 BPM

## 2020-10-26 ENCOUNTER — OFFICE VISIT (OUTPATIENT)
Dept: FAMILY MEDICINE CLINIC | Facility: CLINIC | Age: 60
End: 2020-10-26
Payer: COMMERCIAL

## 2020-10-26 VITALS
BODY MASS INDEX: 28 KG/M2 | OXYGEN SATURATION: 98 % | RESPIRATION RATE: 15 BRPM | SYSTOLIC BLOOD PRESSURE: 164 MMHG | TEMPERATURE: 97 F | WEIGHT: 211 LBS | HEART RATE: 63 BPM | DIASTOLIC BLOOD PRESSURE: 100 MMHG

## 2020-10-26 DIAGNOSIS — Z23 NEED FOR VACCINATION: ICD-10-CM

## 2020-10-26 DIAGNOSIS — E78.2 MIXED HYPERLIPIDEMIA: ICD-10-CM

## 2020-10-26 DIAGNOSIS — R21 RASH AND NONSPECIFIC SKIN ERUPTION: ICD-10-CM

## 2020-10-26 DIAGNOSIS — I10 HYPERTENSION, UNCONTROLLED: Primary | ICD-10-CM

## 2020-10-26 DIAGNOSIS — R25.1 TREMOR OF RIGHT HAND: ICD-10-CM

## 2020-10-26 PROCEDURE — 90471 IMMUNIZATION ADMIN: CPT | Performed by: EMERGENCY MEDICINE

## 2020-10-26 PROCEDURE — 90686 IIV4 VACC NO PRSV 0.5 ML IM: CPT | Performed by: EMERGENCY MEDICINE

## 2020-10-26 PROCEDURE — 99214 OFFICE O/P EST MOD 30 MIN: CPT | Performed by: EMERGENCY MEDICINE

## 2020-10-26 PROCEDURE — 3077F SYST BP >= 140 MM HG: CPT | Performed by: EMERGENCY MEDICINE

## 2020-10-26 PROCEDURE — 3080F DIAST BP >= 90 MM HG: CPT | Performed by: EMERGENCY MEDICINE

## 2020-10-26 NOTE — PROGRESS NOTES
Chief Complaint:   Patient presents with:  Blood Pressure: F/u     HPI:   This is a 56year old male     HYPERTENSION    No home BP checks. Currently on HCTZ and Losartan. Reports good compliance. Has not taken HCTZ because or frequent urination.  Stopp daily., Disp: 90 tablet, Rfl: 1    •  atorvastatin 10 MG Oral Tab, Take 10 mg by mouth., Disp: , Rfl:     •  Olopatadine HCl 0.1 % Ophthalmic Solution, Place 1 drop into both eyes 2 (two) times daily. , Disp: 5 mL, Rfl: 1    •  Multiple Vitamin (MULTI-VITAM uncontrolled  Uncontrolled today due to noncompliance. Encouraged to continue with hydrochlorothiazide. Restart hydrochlorothiazide. Follow-up in 2 weeks with nurse visit for blood pressure recheck.     2. Tremor of right hand  No tremor witnessed today

## 2020-10-26 NOTE — PATIENT INSTRUCTIONS
Thank you for choosing [facility]  To Do:  FOR PUJA BRADLEY      1. Restart Hydrochlorothiazide  2. Daily BP checks and record  3. Follow up in 2 weeks with a nurse visit for BP check  4. Low salt diet  5.  Continue with losartan and atorvastatin smoked  · Cheese  · Tomato juice and ketchup  · Canned vegetables, soups, and fish not labeled as no-salt-added or reduced sodium  · Packaged gravies and sauces  · Olives, pickles, and relish  · Bottled salad dressings    For snacks and desserts  · Yogurt systolic is 438 to 946 or diastolic between 80 to 89 at rest  · Stage 2 high blood pressure is when systolic is 707 or higher or the diastolic is 90 or higher at rest  Taking medicine  · Learn to measure your own blood pressure.  Keep a record of your resul times a week for an average of 40 minutes at a time to lower blood pressure, with your provider's approval. Simple activities such as walking or gardening can help. · Break the smoking habit.  Enroll in a stop-smoking program to improve your chances of suc your risk of these problems. It's important to know the appropriate blood pressure range and remember to check your blood pressure regularly. Doing so can save your life. Blood pressure measurements are given as 2 numbers.  Systolic blood pressure is the u pressure. Generally, a good weight loss goal is to lose 10% of your body weight in a year. · Limit snacks and sweets. · Get regular exercise. Get up and get active  · Find activities you enjoy that can be done alone or with friends or family.  Such act symptoms. In fact, many people don’t know they have it until other problems develop. In most cases, high blood pressure often requires lifelong treatment.    Understanding blood pressure  The circulatory system is made up of the heart and blood vessels that diastolic of less than 80 (120/80)  · Elevated blood pressure is systolic of 464 to 073 and diastolic less than 80  · Stage 1 high blood pressure is systolic is 145 to 462 or diastolic between 80 to 89  · Stage 2 high blood pressure is when systolic is 074 Being overweight makes you more likely to have high blood pressure. Losing excess weight helps lower blood pressure. · Exercise regularly. Daily exercise helps your heart and blood vessels work better and stay healthier.  It can help lower your blood press anything about some risk factors. But other risk factors are things that can be changed. Know what high blood pressure risk factors you have.  Then find out what changes you can make to help control your risk for high blood pressure. Start with the change t follow your healthcare professional's instructions. Established High Blood Pressure    High blood pressure (hypertension) is a long-term (chronic) disease. Often healthcare providers don’t know what causes it.  But it can be caused by certain health this:  ? Don’t eat foods that have a lot of salt. These include olives, pickles, smoked meats, and salted potato chips. ? Don’t add salt to your food at the table. ? Use only small amounts of salt when cooking.   · Start an exercise program. Talk with you Association advises the following guidelines for home blood pressure monitoring:  · Don't smoke or drink coffee for 30 minutes before taking your blood pressure. · Go to the bathroom before the test.  · Relax for 5 minutes before taking the measurement. fainting  · Dizziness or spinning feeling (vertigo)  Zora last reviewed this educational content on 7/1/2019  © 7370-0566 [employer] [address]. 140[address] [facility], [address] [place] [address]. All rights reserved.  This information is not intended as a s

## 2020-11-10 ENCOUNTER — NURSE ONLY (OUTPATIENT)
Dept: FAMILY MEDICINE CLINIC | Facility: CLINIC | Age: 60
End: 2020-11-10
Payer: COMMERCIAL

## 2020-11-10 VITALS — DIASTOLIC BLOOD PRESSURE: 80 MMHG | SYSTOLIC BLOOD PRESSURE: 110 MMHG

## 2020-11-16 ENCOUNTER — OFFICE VISIT (OUTPATIENT)
Dept: SURGERY | Facility: CLINIC | Age: 60
End: 2020-11-16
Payer: COMMERCIAL

## 2020-11-16 VITALS
DIASTOLIC BLOOD PRESSURE: 77 MMHG | WEIGHT: 205 LBS | SYSTOLIC BLOOD PRESSURE: 134 MMHG | HEIGHT: 73 IN | TEMPERATURE: 97 F | HEART RATE: 93 BPM | BODY MASS INDEX: 27.17 KG/M2

## 2020-11-16 DIAGNOSIS — Z12.11 ENCOUNTER FOR SCREENING COLONOSCOPY: ICD-10-CM

## 2020-11-16 DIAGNOSIS — I10 ESSENTIAL HYPERTENSION: Primary | ICD-10-CM

## 2020-11-16 DIAGNOSIS — Z01.818 PRE-OP TESTING: ICD-10-CM

## 2020-11-16 PROCEDURE — 3008F BODY MASS INDEX DOCD: CPT | Performed by: COLON & RECTAL SURGERY

## 2020-11-16 PROCEDURE — 3075F SYST BP GE 130 - 139MM HG: CPT | Performed by: COLON & RECTAL SURGERY

## 2020-11-16 PROCEDURE — 3078F DIAST BP <80 MM HG: CPT | Performed by: COLON & RECTAL SURGERY

## 2020-11-16 PROCEDURE — S0285 CNSLT BEFORE SCREEN COLONOSC: HCPCS | Performed by: COLON & RECTAL SURGERY

## 2020-11-16 RX ORDER — POLYETHYLENE GLYCOL 3350, SODIUM CHLORIDE, SODIUM BICARBONATE, POTASSIUM CHLORIDE 420; 11.2; 5.72; 1.48 G/4L; G/4L; G/4L; G/4L
POWDER, FOR SOLUTION ORAL
Qty: 1 BOTTLE | Refills: 0 | Status: SHIPPED | OUTPATIENT
Start: 2020-11-16 | End: 2021-07-01 | Stop reason: ALTCHOICE

## 2020-11-16 NOTE — H&P
New Patient Visit Note       Active Problems      1. Essential hypertension    2.  Encounter for screening colonoscopy        Chief Complaint   Patient presents with:  Colonoscopy      History of Present Illness   Blanquitacristina Castañeda is a 56year old male referre daily     Alcohol use: Yes      Frequency: Never      Comment: occ    Drug use: No       •  hydrocortisone 2.5 % External Cream, Apply 1 Application topically 2 (two) times daily.  Do not use for more than 14 days consecutively    •  LOSARTAN 100 MG Oral Ta equal, round, and reactive to light. EOM are normal. No scleral icterus. Neck: Normal range of motion. Cardiovascular: Normal rate and regular rhythm. Pulmonary/Chest: Breath sounds normal. No respiratory distress. He has no wheezes.    Abdominal: Sof

## 2020-11-17 NOTE — PATIENT INSTRUCTIONS
Assessment   Essential hypertension  (primary encounter diagnosis)  Encounter for screening colonoscopy      Plan   The patient is 56year old and has not had a colonoscopy. The patient's family history is negative.  The patient does not have gastrointestin

## 2020-12-02 NOTE — TELEPHONE ENCOUNTER
Medication(s) to Refill:   Requested Prescriptions     Pending Prescriptions Disp Refills   • TRIAMCINOLONE ACETONIDE 0.5 % External Cream [Pharmacy Med Name: TRIAMCINOLONE 0.5% CREAM 15GM] 30 g 0     Sig: APPLY EXTERNALLY TO THE AFFECTED AREA TWICE DAILY. Rituxan Counseling:  I discussed with the patient the risks of Rituxan infusions. Side effects can include infusion reactions, severe drug rashes including mucocutaneous reactions, reactivation of latent hepatitis and other infections and rarely progressive multifocal leukoencephalopathy.  All of the patient's questions and concerns were addressed.

## 2020-12-03 ENCOUNTER — APPOINTMENT (OUTPATIENT)
Dept: CARDIOLOGY | Age: 60
End: 2020-12-03

## 2020-12-04 ENCOUNTER — APPOINTMENT (OUTPATIENT)
Dept: LAB | Age: 60
End: 2020-12-04
Attending: COLON & RECTAL SURGERY
Payer: COMMERCIAL

## 2020-12-04 DIAGNOSIS — Z01.818 PRE-OP TESTING: ICD-10-CM

## 2020-12-07 ENCOUNTER — LAB REQUISITION (OUTPATIENT)
Dept: LAB | Facility: HOSPITAL | Age: 60
End: 2020-12-07
Payer: COMMERCIAL

## 2020-12-07 DIAGNOSIS — Z85.038 PERSONAL HISTORY OF OTHER MALIGNANT NEOPLASM OF LARGE INTESTINE: ICD-10-CM

## 2020-12-07 PROCEDURE — 88305 TISSUE EXAM BY PATHOLOGIST: CPT | Performed by: COLON & RECTAL SURGERY

## 2020-12-08 ENCOUNTER — MED REC SCAN ONLY (OUTPATIENT)
Dept: SURGERY | Facility: CLINIC | Age: 60
End: 2020-12-08

## 2020-12-22 ENCOUNTER — PATIENT OUTREACH (OUTPATIENT)
Dept: SURGERY | Facility: CLINIC | Age: 60
End: 2020-12-22

## 2020-12-22 ENCOUNTER — OFFICE VISIT (OUTPATIENT)
Dept: CARDIOLOGY | Age: 60
End: 2020-12-22

## 2020-12-22 VITALS
DIASTOLIC BLOOD PRESSURE: 76 MMHG | WEIGHT: 206 LBS | BODY MASS INDEX: 27.18 KG/M2 | SYSTOLIC BLOOD PRESSURE: 120 MMHG | HEART RATE: 80 BPM

## 2020-12-22 DIAGNOSIS — Z71.6 TOBACCO ABUSE COUNSELING: ICD-10-CM

## 2020-12-22 DIAGNOSIS — E78.2 MIXED HYPERLIPIDEMIA: ICD-10-CM

## 2020-12-22 DIAGNOSIS — I10 ESSENTIAL HYPERTENSION: Primary | ICD-10-CM

## 2020-12-22 DIAGNOSIS — Z72.0 TOBACCO ABUSE: ICD-10-CM

## 2020-12-22 PROCEDURE — 3074F SYST BP LT 130 MM HG: CPT | Performed by: INTERNAL MEDICINE

## 2020-12-22 PROCEDURE — 3078F DIAST BP <80 MM HG: CPT | Performed by: INTERNAL MEDICINE

## 2020-12-22 PROCEDURE — 99214 OFFICE O/P EST MOD 30 MIN: CPT | Performed by: INTERNAL MEDICINE

## 2020-12-22 RX ORDER — LOSARTAN POTASSIUM 100 MG/1
100 TABLET ORAL DAILY
Qty: 90 TABLET | Refills: 3 | Status: SHIPPED | OUTPATIENT
Start: 2020-12-22

## 2020-12-22 SDOH — HEALTH STABILITY: PHYSICAL HEALTH: ON AVERAGE, HOW MANY MINUTES DO YOU ENGAGE IN EXERCISE AT THIS LEVEL?: 60 MIN

## 2020-12-22 SDOH — HEALTH STABILITY: PHYSICAL HEALTH: ON AVERAGE, HOW MANY DAYS PER WEEK DO YOU ENGAGE IN MODERATE TO STRENUOUS EXERCISE (LIKE A BRISK WALK)?: 3 DAYS

## 2020-12-22 ASSESSMENT — ENCOUNTER SYMPTOMS
COUGH: 0
FEVER: 0
HEMATOCHEZIA: 0
BRUISES/BLEEDS EASILY: 0
WEIGHT LOSS: 0
SUSPICIOUS LESIONS: 0
ALLERGIC/IMMUNOLOGIC COMMENTS: NO NEW FOOD ALLERGIES
HEMOPTYSIS: 0
CHILLS: 0
WEIGHT GAIN: 0

## 2020-12-22 ASSESSMENT — PATIENT HEALTH QUESTIONNAIRE - PHQ9
CLINICAL INTERPRETATION OF PHQ2 SCORE: NO FURTHER SCREENING NEEDED
SUM OF ALL RESPONSES TO PHQ9 QUESTIONS 1 AND 2: 0
SUM OF ALL RESPONSES TO PHQ9 QUESTIONS 1 AND 2: 0
1. LITTLE INTEREST OR PLEASURE IN DOING THINGS: NOT AT ALL
CLINICAL INTERPRETATION OF PHQ9 SCORE: NO FURTHER SCREENING NEEDED
2. FEELING DOWN, DEPRESSED OR HOPELESS: NOT AT ALL

## 2020-12-22 NOTE — PROGRESS NOTES
Pt called back, relayed colonoscopy results per Dr. Caren Mcqueen note. Informed patient that next colonoscopy would be in 5 years. Pt verbalized understanding, no further questions at this time.

## 2021-02-05 ENCOUNTER — TELEPHONE (OUTPATIENT)
Dept: CARDIOLOGY | Age: 61
End: 2021-02-05

## 2021-02-05 RX ORDER — ATORVASTATIN CALCIUM 10 MG/1
10 TABLET, FILM COATED ORAL DAILY
Qty: 90 TABLET | Refills: 3 | Status: SHIPPED | OUTPATIENT
Start: 2021-02-05 | End: 2022-03-11 | Stop reason: SDUPTHER

## 2021-02-13 ENCOUNTER — OFFICE VISIT (OUTPATIENT)
Dept: FAMILY MEDICINE CLINIC | Facility: CLINIC | Age: 61
End: 2021-02-13
Payer: COMMERCIAL

## 2021-02-13 VITALS
BODY MASS INDEX: 25.84 KG/M2 | DIASTOLIC BLOOD PRESSURE: 67 MMHG | WEIGHT: 195 LBS | HEIGHT: 73 IN | OXYGEN SATURATION: 100 % | TEMPERATURE: 97 F | SYSTOLIC BLOOD PRESSURE: 116 MMHG | RESPIRATION RATE: 16 BRPM | HEART RATE: 98 BPM

## 2021-02-13 DIAGNOSIS — J02.9 SORE THROAT: ICD-10-CM

## 2021-02-13 DIAGNOSIS — Z20.822 SUSPECTED COVID-19 VIRUS INFECTION: Primary | ICD-10-CM

## 2021-02-13 DIAGNOSIS — H69.82 DYSFUNCTION OF LEFT EUSTACHIAN TUBE: ICD-10-CM

## 2021-02-13 LAB
CONTROL LINE PRESENT WITH A CLEAR BACKGROUND (YES/NO): YES YES/NO
KIT LOT #: NORMAL NUMERIC
STREP GRP A CUL-SCR: NEGATIVE

## 2021-02-13 PROCEDURE — 3078F DIAST BP <80 MM HG: CPT | Performed by: NURSE PRACTITIONER

## 2021-02-13 PROCEDURE — 3074F SYST BP LT 130 MM HG: CPT | Performed by: NURSE PRACTITIONER

## 2021-02-13 PROCEDURE — 99213 OFFICE O/P EST LOW 20 MIN: CPT | Performed by: NURSE PRACTITIONER

## 2021-02-13 PROCEDURE — 3008F BODY MASS INDEX DOCD: CPT | Performed by: NURSE PRACTITIONER

## 2021-02-13 PROCEDURE — 87880 STREP A ASSAY W/OPTIC: CPT | Performed by: NURSE PRACTITIONER

## 2021-02-13 NOTE — PATIENT INSTRUCTIONS
Coronavirus Disease 2019 (COVID-19)     [facility] is committed to the safety and well-being of our patients, members, employees, and communities.  As concerns arise about the new strain of coronavirus that causes COVID-19, [facility] ? After 10 days without testing from date of last exposure  ? After day 7 from date of last exposure with a negative test result (test must occur on day 5 or later)  After stopping quarantine, you should  ? Watch for symptoms until 14 days after exposure. 10. Clean all surfaces that are touched often, like counters, tabletops, and doorknobs. Use household cleaning sprays or wipes according to the label instructions.          Seek Further Care     If you are awaiting test results or are confirmed positive for Patients with pending COVID-19 test results should follow all care and home isolation instructions. Your test results will be called to you from an [facility] representative.  If you have not received a call within 2 business days, please call your pr *Some people will be required to have a repeat COVID-19 test in order to be eligible to donate. If you’re instructed by Nubia Song that a repeat test is required, please contact the [address] [facility] COVID-19 Nurse Triage Line at [phone].     Additional Inf Gargling every hour or 2 can ease irritation.  Try gargling with 1 of these solutions:  · 1/4 teaspoon of salt in 1/2 cup of warm water  · An over-the-counter anesthetic gargle  Use medicine for more relief  Over-the-counter medicine can reduce sore throat © 3167-8105 [employer] [address]. All rights reserved. This information is not intended as a substitute for professional medical care. Always follow your healthcare professional's instructions.

## 2021-02-13 NOTE — PROGRESS NOTES
CHIEF COMPLAINT:   Patient presents with:  Ear Pain: left  Sore Throat      HPI:   Blessing Wilson is a 59year old male who presents for Covid 19 testing. Was at work yesterday when he began to have a sore throat and left ear ache.   No known covid exposure REVIEW OF SYSTEMS:   GENERAL: feels well otherwise,   normal appetite  SKIN: no rashes or abnormal skin lesions  HEENT: See HPI  LUNGS: denies shortness of breath or wheezing, See HPI  CARDIOVASCULAR: denies chest pain or palpitations   GI: denies N/V/C or Please review the entirety of this informational document. It includes information related to exposure, pending tests, positive results, aftercare, and plasma donation.       Quarantine (for anyone in close contact with someone who has COVID-19)  Anyone wh ? Wear a mask, stay at least 6 feet from others, wash your hands, avoid crowds, and take other steps to prevent the spread of COVID-19.   CDC continues to endorse quarantine for 14 days and recognizes that any quarantine shorter than 14 days balances reduce If you are awaiting test results or are confirmed positive for COVID -19, and your symptoms worsen at home with symptoms such as: extreme weakness, difficult breathing, or unrelenting fevers greater than 100.4 degrees Fahrenheit, you should contact your he If you are diagnosed with COVID, refrain from exercise until approved by your primary care provider. Please call your primary care provider within 2 days of your discharge to arrange for a telehealth follow-up.  CDC does not recommend repeat testing after a Centers for Disease Control & Prevention (CDC)  10 things you can do to manage your health at home, [email or web address]. pdf  [email or web address] · Ease pain with anesthetic sprays. Aspirin or an aspirin substitute also helps. Remember, never give aspirin to anyone 25 or younger.  Don't take aspirin if you are already taking blood thinners.   · For sore throats caused by allergies, try antihistamines

## 2021-02-14 LAB — SARS-COV-2 RNA RESP QL NAA+PROBE: NOT DETECTED

## 2021-03-04 DIAGNOSIS — Z23 NEED FOR VACCINATION: ICD-10-CM

## 2021-04-24 DIAGNOSIS — I10 ESSENTIAL HYPERTENSION: ICD-10-CM

## 2021-04-26 RX ORDER — LOSARTAN POTASSIUM 100 MG/1
100 TABLET ORAL DAILY
Qty: 30 TABLET | Refills: 0 | Status: SHIPPED | OUTPATIENT
Start: 2021-04-26 | End: 2021-05-24

## 2021-04-26 RX ORDER — HYDROCHLOROTHIAZIDE 25 MG/1
TABLET ORAL
Qty: 30 TABLET | Refills: 0 | Status: SHIPPED | OUTPATIENT
Start: 2021-04-26 | End: 2021-05-24

## 2021-05-24 DIAGNOSIS — I10 ESSENTIAL HYPERTENSION: ICD-10-CM

## 2021-05-24 RX ORDER — LOSARTAN POTASSIUM 100 MG/1
100 TABLET ORAL DAILY
Qty: 30 TABLET | Refills: 0 | Status: SHIPPED | OUTPATIENT
Start: 2021-05-24 | End: 2021-06-21

## 2021-05-24 RX ORDER — HYDROCHLOROTHIAZIDE 25 MG/1
TABLET ORAL
Qty: 30 TABLET | Refills: 0 | Status: SHIPPED | OUTPATIENT
Start: 2021-05-24 | End: 2021-06-21

## 2021-06-06 DIAGNOSIS — L30.9 ECZEMA, UNSPECIFIED TYPE: ICD-10-CM

## 2021-06-09 RX ORDER — TRIAMCINOLONE ACETONIDE 5 MG/G
CREAM TOPICAL
Qty: 30 G | Refills: 2 | Status: SHIPPED | OUTPATIENT
Start: 2021-06-09 | End: 2021-11-30

## 2021-06-20 DIAGNOSIS — I10 ESSENTIAL HYPERTENSION: ICD-10-CM

## 2021-06-21 RX ORDER — HYDROCHLOROTHIAZIDE 25 MG/1
TABLET ORAL
Qty: 30 TABLET | Refills: 0 | Status: SHIPPED | OUTPATIENT
Start: 2021-06-21 | End: 2021-07-01

## 2021-06-21 RX ORDER — LOSARTAN POTASSIUM 100 MG/1
100 TABLET ORAL DAILY
Qty: 30 TABLET | Refills: 0 | Status: SHIPPED | OUTPATIENT
Start: 2021-06-21 | End: 2021-07-01

## 2021-07-01 ENCOUNTER — OFFICE VISIT (OUTPATIENT)
Dept: FAMILY MEDICINE CLINIC | Facility: CLINIC | Age: 61
End: 2021-07-01
Payer: COMMERCIAL

## 2021-07-01 VITALS
RESPIRATION RATE: 15 BRPM | WEIGHT: 202 LBS | BODY MASS INDEX: 26.77 KG/M2 | DIASTOLIC BLOOD PRESSURE: 78 MMHG | HEART RATE: 96 BPM | SYSTOLIC BLOOD PRESSURE: 110 MMHG | TEMPERATURE: 97 F | HEIGHT: 73 IN | OXYGEN SATURATION: 97 %

## 2021-07-01 DIAGNOSIS — Z13.29 SCREENING FOR ENDOCRINE, NUTRITIONAL, METABOLIC AND IMMUNITY DISORDER: ICD-10-CM

## 2021-07-01 DIAGNOSIS — Z13.228 SCREENING FOR ENDOCRINE, NUTRITIONAL, METABOLIC AND IMMUNITY DISORDER: ICD-10-CM

## 2021-07-01 DIAGNOSIS — Z00.00 ENCOUNTER FOR ANNUAL PHYSICAL EXAM: Primary | ICD-10-CM

## 2021-07-01 DIAGNOSIS — I10 ESSENTIAL HYPERTENSION: ICD-10-CM

## 2021-07-01 DIAGNOSIS — D17.0 LIPOMA OF NECK: ICD-10-CM

## 2021-07-01 DIAGNOSIS — Z12.5 SCREENING FOR PROSTATE CANCER: ICD-10-CM

## 2021-07-01 DIAGNOSIS — Z13.0 SCREENING FOR ENDOCRINE, NUTRITIONAL, METABOLIC AND IMMUNITY DISORDER: ICD-10-CM

## 2021-07-01 DIAGNOSIS — Z13.21 SCREENING FOR ENDOCRINE, NUTRITIONAL, METABOLIC AND IMMUNITY DISORDER: ICD-10-CM

## 2021-07-01 PROCEDURE — 3074F SYST BP LT 130 MM HG: CPT | Performed by: EMERGENCY MEDICINE

## 2021-07-01 PROCEDURE — 99396 PREV VISIT EST AGE 40-64: CPT | Performed by: EMERGENCY MEDICINE

## 2021-07-01 PROCEDURE — 3078F DIAST BP <80 MM HG: CPT | Performed by: EMERGENCY MEDICINE

## 2021-07-01 PROCEDURE — 3008F BODY MASS INDEX DOCD: CPT | Performed by: EMERGENCY MEDICINE

## 2021-07-01 RX ORDER — HYDROCHLOROTHIAZIDE 25 MG/1
25 TABLET ORAL DAILY
Qty: 90 TABLET | Refills: 1 | Status: SHIPPED | OUTPATIENT
Start: 2021-07-01 | End: 2021-07-22

## 2021-07-01 RX ORDER — LOSARTAN POTASSIUM 100 MG/1
100 TABLET ORAL DAILY
Qty: 90 TABLET | Refills: 1 | Status: SHIPPED | OUTPATIENT
Start: 2021-07-01

## 2021-07-01 NOTE — PATIENT INSTRUCTIONS
Thank you for choosing [address] [place] [address] Group  To Do:  FOR PUJA BRADLEY        1. Follow up in 6 months for BP  2. Continue all meds  3. Need to establish  New cardiologist, Dr. Rigoberto Burt  4. Have blood tests done after fasting  5. Need to STOP SMOKING  6. occult blood test or fecal immunochemical test; or a stool DNA test as often as your healthcare provider advises; talk with your healthcare provider about which tests are best for you   Depression All men in this age group At routine exams   Type 2 diabete 6 months; second dose should be given 1 month after the first dose; the third dose should be given at least 2 months after the second dose and at least 4 months after the first dose   Haemophilus influenzae Type B (HIB) Men at increased risk for infection Always follow your healthcare professional's instructions. Understanding a Lipoma     A lipoma is a lump under the skin that’s made of fat. It’s not cancer (benign).  It feels soft like rubber when you press it, and in most cases it doesn’t linda a lipoma  A large lipoma inside the body can press on organs, nerves, or other tissues and cause problems. For example, it can cause problems with breathing or digestion.   Living with a lipoma  Your healthcare provider may look at the lipoma during regular

## 2021-07-01 NOTE — PROGRESS NOTES
Chief Complaint:   Patient presents with:  Physical: Annual physical     HPI:   This is a 59year old male who present for a yearly annual exam    WELL-MALE EXAM     1. Age:              59   2. Have you had any of the following problems:          a.  H hypertension      Past Surgical History:   Procedure Laterality Date   • EYE SURGERY     • FLUOR GID & "[employer] NDL/CATH SPI DX/THER NJX  6/26/2014    Procedure: ;  Surgeon: Theodore Amaro MD;  Location: [address] [place] [address] Spring.me 30 g, Rfl: 0  atorvastatin 10 MG Oral Tab, Take 10 mg by mouth., Disp: , Rfl:   Olopatadine HCl 0.1 % Ophthalmic Solution, Place 1 drop into both eyes 2 (two) times daily. , Disp: 5 mL, Rfl: 1  Multiple Vitamin (MULTI-VITAMIN DAILY) Oral Tab, Take by mouth. continue with combination losartan and hydrochlorothiazide. Okay to continue with atorvastatin. Follow-up in 6 months. - losartan 100 MG Oral Tab; Take 1 tablet (100 mg total) by mouth daily. Dispense: 90 tablet;  Refill: 1  - hydrochlorothiazide 25 MG 50.    Colon Cancer Screening  Yearly rectal exams with hemoccult testing or colonoscopy every 10 years recommended starting at age 48. If personal or family history of colon disease, screening may be required at increased frequency.      Please notify you

## 2021-07-08 ENCOUNTER — LABORATORY ENCOUNTER (OUTPATIENT)
Dept: LAB | Age: 61
End: 2021-07-08
Attending: EMERGENCY MEDICINE
Payer: COMMERCIAL

## 2021-07-08 DIAGNOSIS — Z13.0 SCREENING FOR ENDOCRINE, NUTRITIONAL, METABOLIC AND IMMUNITY DISORDER: ICD-10-CM

## 2021-07-08 DIAGNOSIS — Z12.5 SCREENING FOR PROSTATE CANCER: ICD-10-CM

## 2021-07-08 DIAGNOSIS — Z13.21 SCREENING FOR ENDOCRINE, NUTRITIONAL, METABOLIC AND IMMUNITY DISORDER: ICD-10-CM

## 2021-07-08 DIAGNOSIS — Z13.228 SCREENING FOR ENDOCRINE, NUTRITIONAL, METABOLIC AND IMMUNITY DISORDER: ICD-10-CM

## 2021-07-08 DIAGNOSIS — Z13.29 SCREENING FOR ENDOCRINE, NUTRITIONAL, METABOLIC AND IMMUNITY DISORDER: ICD-10-CM

## 2021-07-08 LAB
ALBUMIN SERPL-MCNC: 3.8 G/DL (ref 3.4–5)
ALBUMIN/GLOB SERPL: 1.1 {RATIO} (ref 1–2)
ALP LIVER SERPL-CCNC: 59 U/L
ALT SERPL-CCNC: 25 U/L
ANION GAP SERPL CALC-SCNC: 7 MMOL/L (ref 0–18)
AST SERPL-CCNC: 22 U/L (ref 15–37)
BASOPHILS # BLD AUTO: 0.04 X10(3) UL (ref 0–0.2)
BASOPHILS NFR BLD AUTO: 0.6 %
BILIRUB SERPL-MCNC: 0.8 MG/DL (ref 0.1–2)
BUN BLD-MCNC: 13 MG/DL (ref 7–18)
BUN/CREAT SERPL: 14.8 (ref 10–20)
CALCIUM BLD-MCNC: 8.9 MG/DL (ref 8.5–10.1)
CHLORIDE SERPL-SCNC: 107 MMOL/L (ref 98–112)
CHOLEST SMN-MCNC: 142 MG/DL (ref ?–200)
CO2 SERPL-SCNC: 24 MMOL/L (ref 21–32)
COMPLEXED PSA SERPL-MCNC: 0.58 NG/ML (ref ?–4)
CREAT BLD-MCNC: 0.88 MG/DL
DEPRECATED RDW RBC AUTO: 43.8 FL (ref 35.1–46.3)
EOSINOPHIL # BLD AUTO: 0.17 X10(3) UL (ref 0–0.7)
EOSINOPHIL NFR BLD AUTO: 2.7 %
ERYTHROCYTE [DISTWIDTH] IN BLOOD BY AUTOMATED COUNT: 13 % (ref 11–15)
GLOBULIN PLAS-MCNC: 3.4 G/DL (ref 2.8–4.4)
GLUCOSE BLD-MCNC: 87 MG/DL (ref 70–99)
HCT VFR BLD AUTO: 41.4 %
HDLC SERPL-MCNC: 44 MG/DL (ref 40–59)
HGB BLD-MCNC: 13.8 G/DL
IMM GRANULOCYTES # BLD AUTO: 0.01 X10(3) UL (ref 0–1)
IMM GRANULOCYTES NFR BLD: 0.2 %
LDLC SERPL CALC-MCNC: 78 MG/DL (ref ?–100)
LYMPHOCYTES # BLD AUTO: 1.56 X10(3) UL (ref 1–4)
LYMPHOCYTES NFR BLD AUTO: 25 %
M PROTEIN MFR SERPL ELPH: 7.2 G/DL (ref 6.4–8.2)
MCH RBC QN AUTO: 30.8 PG (ref 26–34)
MCHC RBC AUTO-ENTMCNC: 33.3 G/DL (ref 31–37)
MCV RBC AUTO: 92.4 FL
MONOCYTES # BLD AUTO: 0.5 X10(3) UL (ref 0.1–1)
MONOCYTES NFR BLD AUTO: 8 %
NEUTROPHILS # BLD AUTO: 3.96 X10 (3) UL (ref 1.5–7.7)
NEUTROPHILS # BLD AUTO: 3.96 X10(3) UL (ref 1.5–7.7)
NEUTROPHILS NFR BLD AUTO: 63.5 %
NONHDLC SERPL-MCNC: 98 MG/DL (ref ?–130)
OSMOLALITY SERPL CALC.SUM OF ELEC: 285 MOSM/KG (ref 275–295)
PATIENT FASTING Y/N/NP: YES
PATIENT FASTING Y/N/NP: YES
PLATELET # BLD AUTO: 224 10(3)UL (ref 150–450)
POTASSIUM SERPL-SCNC: 3.6 MMOL/L (ref 3.5–5.1)
RBC # BLD AUTO: 4.48 X10(6)UL
SODIUM SERPL-SCNC: 138 MMOL/L (ref 136–145)
TRIGL SERPL-MCNC: 111 MG/DL (ref 30–149)
TSI SER-ACNC: 1.34 MIU/ML (ref 0.36–3.74)
VLDLC SERPL CALC-MCNC: 17 MG/DL (ref 0–30)
WBC # BLD AUTO: 6.2 X10(3) UL (ref 4–11)

## 2021-07-08 PROCEDURE — 84153 ASSAY OF PSA TOTAL: CPT | Performed by: EMERGENCY MEDICINE

## 2021-07-08 PROCEDURE — 80050 GENERAL HEALTH PANEL: CPT | Performed by: EMERGENCY MEDICINE

## 2021-07-08 PROCEDURE — 80061 LIPID PANEL: CPT | Performed by: EMERGENCY MEDICINE

## 2021-07-22 DIAGNOSIS — I10 ESSENTIAL HYPERTENSION: ICD-10-CM

## 2021-07-22 RX ORDER — HYDROCHLOROTHIAZIDE 25 MG/1
25 TABLET ORAL DAILY
Qty: 90 TABLET | Refills: 1 | Status: SHIPPED | OUTPATIENT
Start: 2021-07-22 | End: 2022-01-23

## 2021-11-18 ENCOUNTER — APPOINTMENT (OUTPATIENT)
Dept: CARDIOLOGY | Age: 61
End: 2021-11-18

## 2021-11-28 DIAGNOSIS — L30.9 ECZEMA, UNSPECIFIED TYPE: ICD-10-CM

## 2021-11-30 RX ORDER — TRIAMCINOLONE ACETONIDE 5 MG/G
CREAM TOPICAL
Qty: 30 G | Refills: 2 | Status: SHIPPED | OUTPATIENT
Start: 2021-11-30

## 2021-11-30 NOTE — TELEPHONE ENCOUNTER
Medication(s) to Refill:   Requested Prescriptions     Pending Prescriptions Disp Refills   • triamcinolone 0.5 % External Cream [Pharmacy Med Name: TRIAMCINOLONE 0.5% CREAM 15GM] 30 g 2     Sig: APPLY TOPICALLY TO THE AFFECTED AREA TWICE DAILY.  DO NOT USE

## 2022-01-14 ENCOUNTER — IMMUNIZATION (OUTPATIENT)
Dept: LAB | Facility: HOSPITAL | Age: 62
End: 2022-01-14
Attending: EMERGENCY MEDICINE
Payer: COMMERCIAL

## 2022-01-14 ENCOUNTER — IMMUNIZATION (OUTPATIENT)
Dept: FAMILY MEDICINE CLINIC | Facility: CLINIC | Age: 62
End: 2022-01-14
Payer: COMMERCIAL

## 2022-01-14 DIAGNOSIS — Z23 NEED FOR VACCINATION: Primary | ICD-10-CM

## 2022-01-14 PROCEDURE — 90471 IMMUNIZATION ADMIN: CPT | Performed by: EMERGENCY MEDICINE

## 2022-01-14 PROCEDURE — 90686 IIV4 VACC NO PRSV 0.5 ML IM: CPT | Performed by: EMERGENCY MEDICINE

## 2022-01-14 PROCEDURE — 0064A SARSCOV2 VAC 50MCG/0.25ML IM: CPT

## 2022-01-15 ENCOUNTER — MED REC SCAN ONLY (OUTPATIENT)
Dept: FAMILY MEDICINE CLINIC | Facility: CLINIC | Age: 62
End: 2022-01-15

## 2022-01-16 DIAGNOSIS — I10 ESSENTIAL HYPERTENSION: ICD-10-CM

## 2022-01-17 NOTE — TELEPHONE ENCOUNTER
Medication(s) to Refill:   Requested Prescriptions     Pending Prescriptions Disp Refills   • HYDROCHLOROTHIAZIDE 25 MG Oral Tab [Pharmacy Med Name: HYDROCHLOROTHIAZIDE 25MG TABLETS] 90 tablet 1     Sig: TAKE 1 TABLET(25 MG) BY MOUTH DAILY         Reason f

## 2022-01-23 RX ORDER — HYDROCHLOROTHIAZIDE 25 MG/1
TABLET ORAL
Qty: 30 TABLET | Refills: 0 | Status: SHIPPED | OUTPATIENT
Start: 2022-01-23

## 2022-01-23 NOTE — TELEPHONE ENCOUNTER
hydrochlorothiazide refilled x 30 d  Needs office visit before next refill, 30 d supply only  Due for 6 month recheck

## 2022-02-18 ENCOUNTER — TELEPHONE (OUTPATIENT)
Dept: CARDIOLOGY | Age: 62
End: 2022-02-18

## 2022-02-21 ENCOUNTER — OFFICE VISIT (OUTPATIENT)
Dept: FAMILY MEDICINE CLINIC | Facility: CLINIC | Age: 62
End: 2022-02-21
Payer: COMMERCIAL

## 2022-02-21 VITALS
WEIGHT: 211 LBS | OXYGEN SATURATION: 97 % | BODY MASS INDEX: 27.96 KG/M2 | DIASTOLIC BLOOD PRESSURE: 80 MMHG | HEIGHT: 73 IN | SYSTOLIC BLOOD PRESSURE: 132 MMHG | RESPIRATION RATE: 15 BRPM | HEART RATE: 86 BPM

## 2022-02-21 DIAGNOSIS — Z87.898 HISTORY OF NECK SWELLING: ICD-10-CM

## 2022-02-21 DIAGNOSIS — Z72.0 TOBACCO USE: ICD-10-CM

## 2022-02-21 DIAGNOSIS — I10 ESSENTIAL HYPERTENSION: Primary | ICD-10-CM

## 2022-02-21 PROCEDURE — 99214 OFFICE O/P EST MOD 30 MIN: CPT | Performed by: EMERGENCY MEDICINE

## 2022-02-21 PROCEDURE — 3075F SYST BP GE 130 - 139MM HG: CPT | Performed by: EMERGENCY MEDICINE

## 2022-02-21 PROCEDURE — 3008F BODY MASS INDEX DOCD: CPT | Performed by: EMERGENCY MEDICINE

## 2022-02-21 PROCEDURE — 3079F DIAST BP 80-89 MM HG: CPT | Performed by: EMERGENCY MEDICINE

## 2022-02-21 RX ORDER — LOSARTAN POTASSIUM 100 MG/1
100 TABLET ORAL DAILY
Qty: 90 TABLET | Refills: 1 | Status: SHIPPED | OUTPATIENT
Start: 2022-02-21

## 2022-02-21 RX ORDER — HYDROCHLOROTHIAZIDE 25 MG/1
25 TABLET ORAL DAILY
Qty: 90 TABLET | Refills: 1 | Status: SHIPPED | OUTPATIENT
Start: 2022-02-21

## 2022-02-21 RX ORDER — ATORVASTATIN CALCIUM 10 MG/1
10 TABLET, FILM COATED ORAL DAILY
Qty: 90 TABLET | Refills: 3 | OUTPATIENT
Start: 2022-02-21

## 2022-02-27 ENCOUNTER — HOSPITAL ENCOUNTER (OUTPATIENT)
Dept: ULTRASOUND IMAGING | Age: 62
Discharge: HOME | End: 2022-02-27
Attending: EMERGENCY MEDICINE
Payer: COMMERCIAL

## 2022-02-27 DIAGNOSIS — Z87.898 HISTORY OF NECK SWELLING: ICD-10-CM

## 2022-02-27 PROCEDURE — 76536 US EXAM OF HEAD AND NECK: CPT | Performed by: EMERGENCY MEDICINE

## 2022-03-07 PROBLEM — K11.20 SIALADENITIS: Status: ACTIVE | Noted: 2022-03-07

## 2022-03-11 ENCOUNTER — OFFICE VISIT (OUTPATIENT)
Dept: CARDIOLOGY | Age: 62
End: 2022-03-11

## 2022-03-11 VITALS
DIASTOLIC BLOOD PRESSURE: 75 MMHG | BODY MASS INDEX: 27.57 KG/M2 | HEIGHT: 73 IN | HEART RATE: 87 BPM | WEIGHT: 208 LBS | SYSTOLIC BLOOD PRESSURE: 119 MMHG

## 2022-03-11 DIAGNOSIS — Z72.0 TOBACCO ABUSE: ICD-10-CM

## 2022-03-11 DIAGNOSIS — Z71.6 TOBACCO ABUSE COUNSELING: ICD-10-CM

## 2022-03-11 DIAGNOSIS — I10 ESSENTIAL HYPERTENSION: ICD-10-CM

## 2022-03-11 DIAGNOSIS — E78.2 MIXED HYPERLIPIDEMIA: Primary | ICD-10-CM

## 2022-03-11 PROCEDURE — 3078F DIAST BP <80 MM HG: CPT | Performed by: INTERNAL MEDICINE

## 2022-03-11 PROCEDURE — 3074F SYST BP LT 130 MM HG: CPT | Performed by: INTERNAL MEDICINE

## 2022-03-11 PROCEDURE — 99214 OFFICE O/P EST MOD 30 MIN: CPT | Performed by: INTERNAL MEDICINE

## 2022-03-11 RX ORDER — ATORVASTATIN CALCIUM 10 MG/1
10 TABLET, FILM COATED ORAL DAILY
Qty: 90 TABLET | Refills: 3 | Status: SHIPPED | OUTPATIENT
Start: 2022-03-11 | End: 2023-05-26 | Stop reason: SDUPTHER

## 2022-05-22 DIAGNOSIS — I10 ESSENTIAL HYPERTENSION: ICD-10-CM

## 2022-05-24 RX ORDER — LOSARTAN POTASSIUM 100 MG/1
100 TABLET ORAL DAILY
Qty: 90 TABLET | Refills: 0 | Status: SHIPPED | OUTPATIENT
Start: 2022-05-24

## 2022-07-04 DIAGNOSIS — L30.9 ECZEMA, UNSPECIFIED TYPE: ICD-10-CM

## 2022-07-04 RX ORDER — TRIAMCINOLONE ACETONIDE 5 MG/G
CREAM TOPICAL
Qty: 30 G | Refills: 2 | OUTPATIENT
Start: 2022-07-04

## 2022-07-22 DIAGNOSIS — L30.9 ECZEMA, UNSPECIFIED TYPE: ICD-10-CM

## 2022-07-22 DIAGNOSIS — R21 RASH AND NONSPECIFIC SKIN ERUPTION: ICD-10-CM

## 2022-07-23 RX ORDER — TRIAMCINOLONE ACETONIDE 5 MG/G
CREAM TOPICAL
Qty: 30 G | Refills: 2 | Status: SHIPPED | OUTPATIENT
Start: 2022-07-23

## 2022-08-22 ENCOUNTER — TELEPHONE (OUTPATIENT)
Dept: FAMILY MEDICINE CLINIC | Facility: CLINIC | Age: 62
End: 2022-08-22

## 2022-09-20 ENCOUNTER — OFFICE VISIT (OUTPATIENT)
Dept: FAMILY MEDICINE CLINIC | Facility: CLINIC | Age: 62
End: 2022-09-20

## 2022-09-20 VITALS
HEIGHT: 73 IN | BODY MASS INDEX: 27.77 KG/M2 | OXYGEN SATURATION: 98 % | RESPIRATION RATE: 18 BRPM | DIASTOLIC BLOOD PRESSURE: 64 MMHG | WEIGHT: 209.5 LBS | SYSTOLIC BLOOD PRESSURE: 116 MMHG | HEART RATE: 90 BPM

## 2022-09-20 DIAGNOSIS — Z23 NEED FOR SHINGLES VACCINE: ICD-10-CM

## 2022-09-20 DIAGNOSIS — N40.0 PROSTATE HYPERTROPHY: ICD-10-CM

## 2022-09-20 DIAGNOSIS — B07.9 VIRAL WARTS, UNSPECIFIED TYPE: ICD-10-CM

## 2022-09-20 DIAGNOSIS — I10 ESSENTIAL HYPERTENSION: ICD-10-CM

## 2022-09-20 DIAGNOSIS — Z00.00 LABORATORY EXAMINATION ORDERED AS PART OF A ROUTINE GENERAL MEDICAL EXAMINATION: ICD-10-CM

## 2022-09-20 DIAGNOSIS — Z00.00 ENCOUNTER FOR ANNUAL PHYSICAL EXAM: Primary | ICD-10-CM

## 2022-09-20 DIAGNOSIS — L91.8 SKIN TAG: ICD-10-CM

## 2022-09-20 PROCEDURE — 3078F DIAST BP <80 MM HG: CPT | Performed by: EMERGENCY MEDICINE

## 2022-09-20 PROCEDURE — 3008F BODY MASS INDEX DOCD: CPT | Performed by: EMERGENCY MEDICINE

## 2022-09-20 PROCEDURE — 99213 OFFICE O/P EST LOW 20 MIN: CPT | Performed by: EMERGENCY MEDICINE

## 2022-09-20 PROCEDURE — 99396 PREV VISIT EST AGE 40-64: CPT | Performed by: EMERGENCY MEDICINE

## 2022-09-20 PROCEDURE — 3074F SYST BP LT 130 MM HG: CPT | Performed by: EMERGENCY MEDICINE

## 2022-09-20 PROCEDURE — 90471 IMMUNIZATION ADMIN: CPT | Performed by: EMERGENCY MEDICINE

## 2022-09-20 PROCEDURE — 90750 HZV VACC RECOMBINANT IM: CPT | Performed by: EMERGENCY MEDICINE

## 2022-10-04 ENCOUNTER — LAB ENCOUNTER (OUTPATIENT)
Dept: LAB | Age: 62
End: 2022-10-04
Attending: EMERGENCY MEDICINE
Payer: COMMERCIAL

## 2022-10-04 ENCOUNTER — OFFICE VISIT (OUTPATIENT)
Dept: FAMILY MEDICINE CLINIC | Facility: CLINIC | Age: 62
End: 2022-10-04
Payer: COMMERCIAL

## 2022-10-04 VITALS
BODY MASS INDEX: 27.8 KG/M2 | WEIGHT: 209.75 LBS | RESPIRATION RATE: 19 BRPM | HEART RATE: 87 BPM | HEIGHT: 73 IN | DIASTOLIC BLOOD PRESSURE: 68 MMHG | OXYGEN SATURATION: 98 % | SYSTOLIC BLOOD PRESSURE: 118 MMHG

## 2022-10-04 DIAGNOSIS — Z23 NEEDS FLU SHOT: ICD-10-CM

## 2022-10-04 DIAGNOSIS — R21 ATYPICAL RASH: ICD-10-CM

## 2022-10-04 DIAGNOSIS — Z00.00 LABORATORY EXAMINATION ORDERED AS PART OF A ROUTINE GENERAL MEDICAL EXAMINATION: ICD-10-CM

## 2022-10-04 DIAGNOSIS — B07.9 VIRAL WARTS, UNSPECIFIED TYPE: Primary | ICD-10-CM

## 2022-10-04 DIAGNOSIS — Z23 NEED FOR VACCINATION: ICD-10-CM

## 2022-10-04 LAB
ALBUMIN SERPL-MCNC: 3.6 G/DL (ref 3.4–5)
ALBUMIN/GLOB SERPL: 1 {RATIO} (ref 1–2)
ALP LIVER SERPL-CCNC: 69 U/L
ALT SERPL-CCNC: 26 U/L
ANION GAP SERPL CALC-SCNC: 4 MMOL/L (ref 0–18)
AST SERPL-CCNC: 21 U/L (ref 15–37)
BASOPHILS # BLD AUTO: 0.05 X10(3) UL (ref 0–0.2)
BASOPHILS NFR BLD AUTO: 0.7 %
BILIRUB SERPL-MCNC: 0.6 MG/DL (ref 0.1–2)
BUN BLD-MCNC: 12 MG/DL (ref 7–18)
CALCIUM BLD-MCNC: 8.9 MG/DL (ref 8.5–10.1)
CHLORIDE SERPL-SCNC: 107 MMOL/L (ref 98–112)
CHOLEST SERPL-MCNC: 153 MG/DL (ref ?–200)
CO2 SERPL-SCNC: 27 MMOL/L (ref 21–32)
COMPLEXED PSA SERPL-MCNC: 0.6 NG/ML (ref ?–4)
CREAT BLD-MCNC: 1.07 MG/DL
EOSINOPHIL # BLD AUTO: 0.22 X10(3) UL (ref 0–0.7)
EOSINOPHIL NFR BLD AUTO: 3.1 %
ERYTHROCYTE [DISTWIDTH] IN BLOOD BY AUTOMATED COUNT: 12.7 %
FASTING PATIENT LIPID ANSWER: NO
FASTING STATUS PATIENT QL REPORTED: NO
GFR SERPLBLD BASED ON 1.73 SQ M-ARVRAT: 77 ML/MIN/1.73M2 (ref 60–?)
GLOBULIN PLAS-MCNC: 3.5 G/DL (ref 2.8–4.4)
GLUCOSE BLD-MCNC: 103 MG/DL (ref 70–99)
HCT VFR BLD AUTO: 38.2 %
HDLC SERPL-MCNC: 45 MG/DL (ref 40–59)
HGB BLD-MCNC: 13 G/DL
IMM GRANULOCYTES # BLD AUTO: 0.02 X10(3) UL (ref 0–1)
IMM GRANULOCYTES NFR BLD: 0.3 %
LDLC SERPL CALC-MCNC: 83 MG/DL (ref ?–100)
LYMPHOCYTES # BLD AUTO: 1.77 X10(3) UL (ref 1–4)
LYMPHOCYTES NFR BLD AUTO: 25.2 %
MCH RBC QN AUTO: 31 PG (ref 26–34)
MCHC RBC AUTO-ENTMCNC: 34 G/DL (ref 31–37)
MCV RBC AUTO: 91 FL
MONOCYTES # BLD AUTO: 0.59 X10(3) UL (ref 0.1–1)
MONOCYTES NFR BLD AUTO: 8.4 %
NEUTROPHILS # BLD AUTO: 4.37 X10 (3) UL (ref 1.5–7.7)
NEUTROPHILS # BLD AUTO: 4.37 X10(3) UL (ref 1.5–7.7)
NEUTROPHILS NFR BLD AUTO: 62.3 %
NONHDLC SERPL-MCNC: 108 MG/DL (ref ?–130)
OSMOLALITY SERPL CALC.SUM OF ELEC: 286 MOSM/KG (ref 275–295)
PLATELET # BLD AUTO: 248 10(3)UL (ref 150–450)
POTASSIUM SERPL-SCNC: 3.6 MMOL/L (ref 3.5–5.1)
PROT SERPL-MCNC: 7.1 G/DL (ref 6.4–8.2)
RBC # BLD AUTO: 4.2 X10(6)UL
SODIUM SERPL-SCNC: 138 MMOL/L (ref 136–145)
TRIGL SERPL-MCNC: 140 MG/DL (ref 30–149)
TSI SER-ACNC: 1.34 MIU/ML (ref 0.36–3.74)
VLDLC SERPL CALC-MCNC: 22 MG/DL (ref 0–30)
WBC # BLD AUTO: 7 X10(3) UL (ref 4–11)

## 2022-10-04 PROCEDURE — 90686 IIV4 VACC NO PRSV 0.5 ML IM: CPT | Performed by: EMERGENCY MEDICINE

## 2022-10-04 PROCEDURE — 3008F BODY MASS INDEX DOCD: CPT | Performed by: EMERGENCY MEDICINE

## 2022-10-04 PROCEDURE — 84153 ASSAY OF PSA TOTAL: CPT | Performed by: EMERGENCY MEDICINE

## 2022-10-04 PROCEDURE — 17110 DESTRUCTION B9 LES UP TO 14: CPT | Performed by: EMERGENCY MEDICINE

## 2022-10-04 PROCEDURE — 90471 IMMUNIZATION ADMIN: CPT | Performed by: EMERGENCY MEDICINE

## 2022-10-04 PROCEDURE — 3078F DIAST BP <80 MM HG: CPT | Performed by: EMERGENCY MEDICINE

## 2022-10-04 PROCEDURE — 80050 GENERAL HEALTH PANEL: CPT | Performed by: EMERGENCY MEDICINE

## 2022-10-04 PROCEDURE — 80061 LIPID PANEL: CPT | Performed by: EMERGENCY MEDICINE

## 2022-10-04 PROCEDURE — 3074F SYST BP LT 130 MM HG: CPT | Performed by: EMERGENCY MEDICINE

## 2022-10-04 NOTE — PROGRESS NOTES
Procedure Note  Prodedure: Wart Destruction    Location: right upper arm, small 4 mm verrucous skin lesion to back of right upper arm      Quantity: 1         The procedure's risks, benefits, alternatives, infection risk and bleeding risk were discussed with the patient. Area of wart was cleansed with alcohol swab. Multiple applications of liquid nitrogen using liquid nitrogen spray tip done resulting in whitish discoloration of wart. The patient tolerated the procedure well. There were no complications. Slight bleeding was controlled with direct pressure. Follow-up in the office in 4-6 week(s). Sooner if signs of infection. Wound care instructions were provided, anticipatory guidelines ie:possible blister formation, black color, and pain.

## 2022-10-18 DIAGNOSIS — I10 ESSENTIAL HYPERTENSION: ICD-10-CM

## 2022-10-18 RX ORDER — LOSARTAN POTASSIUM 100 MG/1
TABLET ORAL
Qty: 90 TABLET | Refills: 0 | Status: SHIPPED | OUTPATIENT
Start: 2022-10-18

## 2022-10-24 ENCOUNTER — APPOINTMENT (OUTPATIENT)
Dept: URBAN - METROPOLITAN AREA CLINIC 247 | Age: 62
Setting detail: DERMATOLOGY
End: 2022-10-26

## 2022-10-24 DIAGNOSIS — L20.89 OTHER ATOPIC DERMATITIS: ICD-10-CM

## 2022-10-24 DIAGNOSIS — B07.8 OTHER VIRAL WARTS: ICD-10-CM

## 2022-10-24 DIAGNOSIS — L28.0 LICHEN SIMPLEX CHRONICUS: ICD-10-CM

## 2022-10-24 PROBLEM — L20.84 INTRINSIC (ALLERGIC) ECZEMA: Status: ACTIVE | Noted: 2022-10-24

## 2022-10-24 PROCEDURE — 17110 DESTRUCT B9 LESION 1-14: CPT

## 2022-10-24 PROCEDURE — OTHER MIPS QUALITY: OTHER

## 2022-10-24 PROCEDURE — OTHER LIQUID NITROGEN: OTHER

## 2022-10-24 PROCEDURE — OTHER COUNSELING: TOPICAL STEROIDS: OTHER

## 2022-10-24 PROCEDURE — OTHER COUNSELING: OTHER

## 2022-10-24 PROCEDURE — 99203 OFFICE O/P NEW LOW 30 MIN: CPT | Mod: 25

## 2022-10-24 PROCEDURE — OTHER PRESCRIPTION MEDICATION MANAGEMENT: OTHER

## 2022-10-24 ASSESSMENT — LOCATION ZONE DERM
LOCATION ZONE: TRUNK
LOCATION ZONE: ARM

## 2022-10-24 ASSESSMENT — LOCATION DETAILED DESCRIPTION DERM
LOCATION DETAILED: LEFT ANTERIOR PROXIMAL UPPER ARM
LOCATION DETAILED: RIGHT ANTERIOR PROXIMAL UPPER ARM
LOCATION DETAILED: RIGHT DISTAL POSTERIOR UPPER ARM
LOCATION DETAILED: RIGHT RIB CAGE

## 2022-10-24 ASSESSMENT — LOCATION SIMPLE DESCRIPTION DERM
LOCATION SIMPLE: ABDOMEN
LOCATION SIMPLE: RIGHT UPPER ARM
LOCATION SIMPLE: LEFT UPPER ARM

## 2022-10-24 NOTE — PROCEDURE: PRESCRIPTION MEDICATION MANAGEMENT
Detail Level: Zone
Initiate Treatment: Tac cream to aa bid PRN flare, aware to be applied to moist skin
Render In Strict Bullet Format?: No

## 2022-10-24 NOTE — PROCEDURE: LIQUID NITROGEN
Include Z78.9 (Other Specified Conditions Influencing Health Status) As An Associated Diagnosis?: No
Duration Of Freeze Thaw-Cycle (Seconds): 5-10
Application Tool (Optional): Cry-AC
Show Aperture Variable?: Yes
Detail Level: Detailed
Spray Paint Text: The liquid nitrogen was applied to the skin utilizing a spray paint frosting technique.
Consent: The patient's consent was obtained including but not limited to risks of crusting, scabbing, blistering, scarring, darker or lighter pigmentary change, recurrence, incomplete removal and infection.
Number Of Freeze-Thaw Cycles: 2 freeze-thaw cycles
Medical Necessity Information: It is in your best interest to select a reason for this procedure from the list below. All of these items fulfill various CMS LCD requirements except the new and changing color options.
Medical Necessity Clause: This procedure was medically necessary because the lesions that were treated were:
Post-Care Instructions: I reviewed with the patient in detail post-care instructions. Patient is to wear sunprotection, and avoid picking at any of the treated lesions. Pt may apply Vaseline to crusted or scabbing areas.

## 2022-10-28 ENCOUNTER — TELEPHONE (OUTPATIENT)
Dept: FAMILY MEDICINE CLINIC | Facility: CLINIC | Age: 62
End: 2022-10-28

## 2022-12-17 ENCOUNTER — OFFICE VISIT (OUTPATIENT)
Dept: FAMILY MEDICINE CLINIC | Facility: CLINIC | Age: 62
End: 2022-12-17
Payer: COMMERCIAL

## 2022-12-17 VITALS
RESPIRATION RATE: 16 BRPM | HEIGHT: 73 IN | TEMPERATURE: 98 F | OXYGEN SATURATION: 98 % | DIASTOLIC BLOOD PRESSURE: 82 MMHG | SYSTOLIC BLOOD PRESSURE: 120 MMHG | HEART RATE: 97 BPM | BODY MASS INDEX: 26.24 KG/M2 | WEIGHT: 198 LBS

## 2022-12-17 DIAGNOSIS — J06.9 VIRAL UPPER RESPIRATORY ILLNESS: ICD-10-CM

## 2022-12-17 DIAGNOSIS — J02.0 STREP THROAT: ICD-10-CM

## 2022-12-17 DIAGNOSIS — J02.9 SORE THROAT: Primary | ICD-10-CM

## 2022-12-17 LAB
CONTROL LINE PRESENT WITH A CLEAR BACKGROUND (YES/NO): YES YES/NO
STREP GRP A CUL-SCR: POSITIVE

## 2022-12-17 PROCEDURE — 99213 OFFICE O/P EST LOW 20 MIN: CPT | Performed by: NURSE PRACTITIONER

## 2022-12-17 PROCEDURE — 3008F BODY MASS INDEX DOCD: CPT | Performed by: NURSE PRACTITIONER

## 2022-12-17 PROCEDURE — 3079F DIAST BP 80-89 MM HG: CPT | Performed by: NURSE PRACTITIONER

## 2022-12-17 PROCEDURE — 3074F SYST BP LT 130 MM HG: CPT | Performed by: NURSE PRACTITIONER

## 2022-12-17 PROCEDURE — 87880 STREP A ASSAY W/OPTIC: CPT | Performed by: NURSE PRACTITIONER

## 2022-12-17 RX ORDER — AMOXICILLIN 500 MG/1
500 CAPSULE ORAL 2 TIMES DAILY
Qty: 20 CAPSULE | Refills: 0 | Status: SHIPPED | OUTPATIENT
Start: 2022-12-17 | End: 2022-12-27

## 2022-12-18 LAB — SARS-COV-2 RNA RESP QL NAA+PROBE: DETECTED

## 2023-01-16 DIAGNOSIS — I10 ESSENTIAL HYPERTENSION: ICD-10-CM

## 2023-01-16 RX ORDER — LOSARTAN POTASSIUM 100 MG/1
TABLET ORAL
Qty: 90 TABLET | Refills: 0 | Status: SHIPPED | OUTPATIENT
Start: 2023-01-16

## 2023-01-16 RX ORDER — HYDROCHLOROTHIAZIDE 25 MG/1
TABLET ORAL
Qty: 90 TABLET | Refills: 1 | Status: SHIPPED | OUTPATIENT
Start: 2023-01-16

## 2023-02-01 DIAGNOSIS — R21 RASH AND NONSPECIFIC SKIN ERUPTION: ICD-10-CM

## 2023-02-13 ENCOUNTER — APPOINTMENT (OUTPATIENT)
Dept: URBAN - METROPOLITAN AREA CLINIC 247 | Age: 63
Setting detail: DERMATOLOGY
End: 2023-02-15

## 2023-02-13 DIAGNOSIS — L20.89 OTHER ATOPIC DERMATITIS: ICD-10-CM

## 2023-02-13 PROBLEM — L20.84 INTRINSIC (ALLERGIC) ECZEMA: Status: ACTIVE | Noted: 2023-02-13

## 2023-02-13 PROCEDURE — 99213 OFFICE O/P EST LOW 20 MIN: CPT

## 2023-02-13 PROCEDURE — OTHER MIPS QUALITY: OTHER

## 2023-02-13 PROCEDURE — OTHER PRESCRIPTION MEDICATION MANAGEMENT: OTHER

## 2023-02-13 PROCEDURE — OTHER COUNSELING: OTHER

## 2023-02-13 ASSESSMENT — LOCATION SIMPLE DESCRIPTION DERM
LOCATION SIMPLE: RIGHT UPPER ARM
LOCATION SIMPLE: LEFT UPPER ARM
LOCATION SIMPLE: ABDOMEN

## 2023-02-13 ASSESSMENT — LOCATION ZONE DERM
LOCATION ZONE: TRUNK
LOCATION ZONE: ARM

## 2023-02-13 ASSESSMENT — LOCATION DETAILED DESCRIPTION DERM
LOCATION DETAILED: LEFT ANTERIOR PROXIMAL UPPER ARM
LOCATION DETAILED: RIGHT ANTERIOR PROXIMAL UPPER ARM
LOCATION DETAILED: RIGHT RIB CAGE

## 2023-02-13 NOTE — PROCEDURE: PRESCRIPTION MEDICATION MANAGEMENT
Detail Level: Zone
Render In Strict Bullet Format?: No
Continue Regimen: Tac cream to aa bid PRN flare, aware to be applied to moist skin

## 2023-04-07 ENCOUNTER — APPOINTMENT (OUTPATIENT)
Dept: CARDIOLOGY | Age: 63
End: 2023-04-07

## 2023-04-21 ENCOUNTER — APPOINTMENT (OUTPATIENT)
Dept: CARDIOLOGY | Age: 63
End: 2023-04-21

## 2023-05-26 ENCOUNTER — OFFICE VISIT (OUTPATIENT)
Dept: CARDIOLOGY | Age: 63
End: 2023-05-26

## 2023-05-26 VITALS
HEIGHT: 73 IN | BODY MASS INDEX: 27.17 KG/M2 | WEIGHT: 205 LBS | SYSTOLIC BLOOD PRESSURE: 130 MMHG | DIASTOLIC BLOOD PRESSURE: 85 MMHG | HEART RATE: 94 BPM

## 2023-05-26 DIAGNOSIS — I10 ESSENTIAL HYPERTENSION: ICD-10-CM

## 2023-05-26 DIAGNOSIS — Z72.0 TOBACCO ABUSE: ICD-10-CM

## 2023-05-26 DIAGNOSIS — E78.2 MIXED HYPERLIPIDEMIA: Primary | ICD-10-CM

## 2023-05-26 PROCEDURE — 3075F SYST BP GE 130 - 139MM HG: CPT | Performed by: INTERNAL MEDICINE

## 2023-05-26 PROCEDURE — 3079F DIAST BP 80-89 MM HG: CPT | Performed by: INTERNAL MEDICINE

## 2023-05-26 PROCEDURE — 99214 OFFICE O/P EST MOD 30 MIN: CPT | Performed by: INTERNAL MEDICINE

## 2023-05-26 RX ORDER — ATORVASTATIN CALCIUM 10 MG/1
10 TABLET, FILM COATED ORAL DAILY
Qty: 90 TABLET | Refills: 3 | Status: SHIPPED | OUTPATIENT
Start: 2023-05-26

## 2023-05-29 ENCOUNTER — HOSPITAL ENCOUNTER (EMERGENCY)
Age: 63
Discharge: HOME | End: 2023-05-29
Attending: EMERGENCY MEDICINE
Payer: MEDICARE

## 2023-05-29 VITALS
HEIGHT: 73 IN | TEMPERATURE: 98 F | DIASTOLIC BLOOD PRESSURE: 78 MMHG | OXYGEN SATURATION: 99 % | BODY MASS INDEX: 27.17 KG/M2 | RESPIRATION RATE: 18 BRPM | WEIGHT: 205 LBS | SYSTOLIC BLOOD PRESSURE: 115 MMHG | HEART RATE: 88 BPM

## 2023-05-29 DIAGNOSIS — R19.7 DIARRHEA, UNSPECIFIED TYPE: Primary | ICD-10-CM

## 2023-05-29 LAB
ALBUMIN SERPL-MCNC: 3.5 G/DL (ref 3.4–5)
ALBUMIN/GLOB SERPL: 0.9 {RATIO} (ref 1–2)
ALP LIVER SERPL-CCNC: 74 U/L
ALT SERPL-CCNC: 32 U/L
ANION GAP SERPL CALC-SCNC: 5 MMOL/L (ref 0–18)
AST SERPL-CCNC: 31 U/L (ref 15–37)
BASOPHILS # BLD AUTO: 0.02 X10(3) UL (ref 0–0.2)
BASOPHILS NFR BLD AUTO: 0.4 %
BILIRUB SERPL-MCNC: 0.7 MG/DL (ref 0.1–2)
BUN BLD-MCNC: 15 MG/DL (ref 7–18)
CALCIUM BLD-MCNC: 8.6 MG/DL (ref 8.5–10.1)
CHLORIDE SERPL-SCNC: 101 MMOL/L (ref 98–112)
CO2 SERPL-SCNC: 27 MMOL/L (ref 21–32)
CREAT BLD-MCNC: 1.04 MG/DL
EOSINOPHIL # BLD AUTO: 0.05 X10(3) UL (ref 0–0.7)
EOSINOPHIL NFR BLD AUTO: 1 %
ERYTHROCYTE [DISTWIDTH] IN BLOOD BY AUTOMATED COUNT: 13 %
GFR SERPLBLD BASED ON 1.73 SQ M-ARVRAT: 80 ML/MIN/1.73M2 (ref 60–?)
GLOBULIN PLAS-MCNC: 3.7 G/DL (ref 2.8–4.4)
GLUCOSE BLD-MCNC: 94 MG/DL (ref 70–99)
HCT VFR BLD AUTO: 38.8 %
HGB BLD-MCNC: 13.4 G/DL
IMM GRANULOCYTES # BLD AUTO: 0.02 X10(3) UL (ref 0–1)
IMM GRANULOCYTES NFR BLD: 0.4 %
LYMPHOCYTES # BLD AUTO: 1.29 X10(3) UL (ref 1–4)
LYMPHOCYTES NFR BLD AUTO: 25 %
MCH RBC QN AUTO: 30.6 PG (ref 26–34)
MCHC RBC AUTO-ENTMCNC: 34.5 G/DL (ref 31–37)
MCV RBC AUTO: 88.6 FL
MONOCYTES # BLD AUTO: 0.81 X10(3) UL (ref 0.1–1)
MONOCYTES NFR BLD AUTO: 15.7 %
NEUTROPHILS # BLD AUTO: 2.98 X10 (3) UL (ref 1.5–7.7)
NEUTROPHILS # BLD AUTO: 2.98 X10(3) UL (ref 1.5–7.7)
NEUTROPHILS NFR BLD AUTO: 57.5 %
OSMOLALITY SERPL CALC.SUM OF ELEC: 277 MOSM/KG (ref 275–295)
PLATELET # BLD AUTO: 228 10(3)UL (ref 150–450)
POTASSIUM SERPL-SCNC: 3.2 MMOL/L (ref 3.5–5.1)
PROT SERPL-MCNC: 7.2 G/DL (ref 6.4–8.2)
RBC # BLD AUTO: 4.38 X10(6)UL
SODIUM SERPL-SCNC: 133 MMOL/L (ref 136–145)
WBC # BLD AUTO: 5.2 X10(3) UL (ref 4–11)

## 2023-05-29 PROCEDURE — 99284 EMERGENCY DEPT VISIT MOD MDM: CPT

## 2023-05-29 PROCEDURE — 87507 IADNA-DNA/RNA PROBE TQ 12-25: CPT | Performed by: EMERGENCY MEDICINE

## 2023-05-29 PROCEDURE — 87493 C DIFF AMPLIFIED PROBE: CPT | Performed by: EMERGENCY MEDICINE

## 2023-05-29 PROCEDURE — 96360 HYDRATION IV INFUSION INIT: CPT

## 2023-05-29 PROCEDURE — 80053 COMPREHEN METABOLIC PANEL: CPT | Performed by: EMERGENCY MEDICINE

## 2023-05-29 PROCEDURE — 96361 HYDRATE IV INFUSION ADD-ON: CPT

## 2023-05-29 PROCEDURE — 85025 COMPLETE CBC W/AUTO DIFF WBC: CPT | Performed by: EMERGENCY MEDICINE

## 2023-05-29 RX ORDER — HYOSCYAMINE SULFATE 0.125 MG
125 TABLET ORAL EVERY 4 HOURS PRN
Qty: 15 TABLET | Refills: 0 | Status: SHIPPED | OUTPATIENT
Start: 2023-05-29

## 2023-05-29 RX ORDER — POTASSIUM CHLORIDE 20 MEQ/1
40 TABLET, EXTENDED RELEASE ORAL ONCE
Status: COMPLETED | OUTPATIENT
Start: 2023-05-29 | End: 2023-05-29

## 2023-05-30 LAB
ADENOVIRUS F 40/41 PCR: NEGATIVE
ASTROVIRUS PCR: NEGATIVE
C CAYETANENSIS DNA SPEC QL NAA+PROBE: NEGATIVE
C DIFF TOX B STL QL: NEGATIVE
CAMPY SP DNA.DIARRHEA STL QL NAA+PROBE: NEGATIVE
CRYPTOSP DNA SPEC QL NAA+PROBE: NEGATIVE
EAEC PAA PLAS AGGR+AATA ST NAA+NON-PRB: NEGATIVE
EC STX1+STX2 + H7 FLIC SPEC NAA+PROBE: NEGATIVE
ENTAMOEBA HISTOLYTICA PCR: NEGATIVE
EPEC EAE GENE STL QL NAA+NON-PROBE: NEGATIVE
ETEC LTA+ST1A+ST1B TOX ST NAA+NON-PROBE: NEGATIVE
GIARDIA LAMBLIA PCR: NEGATIVE
NOROVIRUS GI/GII PCR: NEGATIVE
P SHIGELLOIDES DNA STL QL NAA+PROBE: NEGATIVE
ROTAVIRUS A PCR: POSITIVE
SALMONELLA DNA SPEC QL NAA+PROBE: NEGATIVE
SAPOVIRUS PCR: NEGATIVE
SHIGELLA SP+EIEC IPAH ST NAA+NON-PROBE: NEGATIVE
V CHOLERAE DNA SPEC QL NAA+PROBE: NEGATIVE
VIBRIO DNA SPEC NAA+PROBE: NEGATIVE
YERSINIA DNA SPEC NAA+PROBE: NEGATIVE

## 2023-05-30 NOTE — DISCHARGE INSTRUCTIONS
Can use Pepto-Bismol at home for symptom relief. Clear liquid diet advance as tolerated. Bring stool sample back for analysis. Follow-up with your PCP this week.

## 2023-05-30 NOTE — ED INITIAL ASSESSMENT (HPI)
Pt to ed with c/o n/v/d since Friday, denies fevers, Pt c/o LL back/flankl pain since Friday, denies urinary symptoms

## 2023-07-10 ENCOUNTER — TELEPHONE (OUTPATIENT)
Dept: CARDIOLOGY | Age: 63
End: 2023-07-10

## 2023-08-03 DIAGNOSIS — I10 ESSENTIAL HYPERTENSION: ICD-10-CM

## 2023-08-03 NOTE — TELEPHONE ENCOUNTER
Medication(s) to Refill:   Requested Prescriptions     Pending Prescriptions Disp Refills    HYDROCHLOROTHIAZIDE 25 MG Oral Tab [Pharmacy Med Name: HYDROCHLOROTHIAZIDE 25MG TABLETS] 90 tablet 1     Sig: TAKE 1 TABLET(25 MG) BY MOUTH DAILY         Reason for Medication Refill being sent to Provider / Reason Protocol Failed:  [] 90 day refill has already been granted  [] Blood Pressure out of range  [] Labs Abnormal/over due  [] Medication not previously prescribed by Provider  [] Non-Protocol Medication  [] Controlled Substance   [x] Due for appointment- no future appointment scheduled  [] No Follow up specified      Last Time Medication was Filled:  5/9/2023      Last Office Visit with PCP: 10/4/2022    When Patient was Due Back to the Office:  around 3/2023  (from when PCP last addressed condition)    Future Appointments:  No future appointments.       Last Blood Pressures:  BP Readings from Last 2 Encounters:  05/29/23 : 115/78  12/17/22 : 120/82    Action taken:  [] Refill approved per protocol  [x] Routing to provider for approval

## 2023-08-04 ENCOUNTER — ANCILLARY PROCEDURE (OUTPATIENT)
Dept: CARDIOLOGY | Age: 63
End: 2023-08-04
Attending: INTERNAL MEDICINE

## 2023-08-04 ENCOUNTER — EXTERNAL RECORD (OUTPATIENT)
Dept: OTHER | Age: 63
End: 2023-08-04

## 2023-08-04 VITALS — WEIGHT: 205 LBS | BODY MASS INDEX: 27.17 KG/M2 | HEIGHT: 73 IN

## 2023-08-04 DIAGNOSIS — Z72.0 TOBACCO ABUSE: ICD-10-CM

## 2023-08-04 DIAGNOSIS — I10 ESSENTIAL HYPERTENSION: ICD-10-CM

## 2023-08-04 DIAGNOSIS — E78.2 MIXED HYPERLIPIDEMIA: ICD-10-CM

## 2023-08-04 LAB
LV EF: 64 %
RESTING HR ACHIEVED: 70 BPM
STRESS BASELINE BP: NORMAL MMHG
STRESS TARGET HR: 155 BPM

## 2023-08-04 PROCEDURE — 78452 HT MUSCLE IMAGE SPECT MULT: CPT | Performed by: INTERNAL MEDICINE

## 2023-08-04 PROCEDURE — A9502 TC99M TETROFOSMIN: HCPCS | Performed by: INTERNAL MEDICINE

## 2023-08-04 PROCEDURE — 93015 CV STRESS TEST SUPVJ I&R: CPT | Performed by: INTERNAL MEDICINE

## 2023-08-04 ASSESSMENT — EXERCISE STRESS TEST
PEAK_BP: 140/80
STAGE_CATEGORIES: RESTING
STAGE_CATEGORIES: RECOVERY 2
GRADE: 16
STAGE_CATEGORIES: 4
PEAK_BP: 140/84
PEAK_BP: 140/78
STAGE_CATEGORIES: 1
STAGE_CATEGORIES: RECOVERY 1
PEAK_RPP: 15120
GRADE: 14
PEAK_HR: 144
PEAK_RPP: 30080
PEAK_BP: 172/80
PEAK_RPP: 15688
GRADE: 12
PEAK_RPP: 30080
PEAK_HR: 95
PEAK_BP: 148/82
PEAK_BP: 166/80
MPH: 4.2
PEAK_RPP: 13300
STAGE_CATEGORIES: RECOVERY 0
PEAK_HR: 160
PEAK_METS: 10.2
PEAK_RPP: 9800
PEAK_HR: 70
PEAK_BP: 188/84
GRADE: 10
MPH: 3.4
STAGE_CATEGORIES: 3
PEAK_BP: 188/84
MPH: 2.5
PEAK_HR: 108
PEAK_HR: 160
MPH: 1.7
PEAK_RPP: 21414
PEAK_HR: 106
STOPPAGE_REASON: GENERAL FATIGUE
PEAK_RPP: 24768
STAGE_CATEGORIES: 2
PEAK_HR: 129

## 2023-08-06 DIAGNOSIS — I10 ESSENTIAL HYPERTENSION: ICD-10-CM

## 2023-08-07 DIAGNOSIS — I10 ESSENTIAL HYPERTENSION: ICD-10-CM

## 2023-08-07 RX ORDER — LOSARTAN POTASSIUM 100 MG/1
100 TABLET ORAL DAILY
Qty: 90 TABLET | Refills: 0 | OUTPATIENT
Start: 2023-08-07

## 2023-08-07 RX ORDER — LOSARTAN POTASSIUM 100 MG/1
100 TABLET ORAL DAILY
Qty: 30 TABLET | Refills: 0 | Status: SHIPPED | OUTPATIENT
Start: 2023-08-07

## 2023-08-07 NOTE — TELEPHONE ENCOUNTER
Requesting refill on losartan. LOV 10/4/2022. LF 1/16/2023. Rx refilled for 30 days. PSR: Please assist in scheduling F/U. Thank you.

## 2023-08-10 ENCOUNTER — ANCILLARY PROCEDURE (OUTPATIENT)
Dept: CARDIOLOGY | Age: 63
End: 2023-08-10
Attending: INTERNAL MEDICINE

## 2023-08-10 DIAGNOSIS — I10 ESSENTIAL HYPERTENSION: ICD-10-CM

## 2023-08-10 DIAGNOSIS — E78.2 MIXED HYPERLIPIDEMIA: ICD-10-CM

## 2023-08-10 DIAGNOSIS — Z72.0 TOBACCO ABUSE: ICD-10-CM

## 2023-08-10 LAB
AORTIC VALVE AREA (AVA): 0.85
ASCENDING AORTA (AAD): 3
AV PEAK GRADIENT (AVPG): 6
AV PEAK VELOCITY (AVPV): 1.27
AV STENOSIS SEVERITY TEXT: NORMAL
AVI LVOT PEAK GRADIENT (LVOTMG): 1.4
E WAVE DECELARATION TIME (MDT): 13.49
INTERVENTRICULAR SEPTUM IN END DIASTOLE (IVSD): 2.08
LEFT INTERNAL DIMENSION IN SYSTOLE (LVSD): 1.2
LEFT VENTRICULAR INTERNAL DIMENSION IN DIASTOLE (LVDD): 3.1
LEFT VENTRICULAR POSTERIOR WALL IN END DIASTOLE (LVPW): 4.2
LV EF: NORMAL %
LVOT VTI (LVOTVTI): 1.02
MV E TISSUE VEL MED (MESV): 10.7
MV E WAVE VEL/E TISSUE VEL MED(MSR): 6.31
MV PEAK A VELOCITY (MVPAV): 179
MV PEAK E VELOCITY (MVPEV): 0.94
RV END SYSTOLIC LONGITUDINAL STRAIN FREE WALL (RVGS): 2.2
TRICUSPID VALVE PEAK REGURGITATION VELOCITY (TRPV): 3.5
TV ESTIMATED RIGHT ARTERIAL PRESSURE (RAP): 12

## 2023-08-10 PROCEDURE — 76376 3D RENDER W/INTRP POSTPROCES: CPT | Performed by: INTERNAL MEDICINE

## 2023-08-10 PROCEDURE — 93306 TTE W/DOPPLER COMPLETE: CPT | Performed by: INTERNAL MEDICINE

## 2023-08-17 DIAGNOSIS — I10 ESSENTIAL HYPERTENSION: ICD-10-CM

## 2023-08-17 RX ORDER — HYDROCHLOROTHIAZIDE 25 MG/1
TABLET ORAL
Qty: 30 TABLET | Refills: 0 | Status: SHIPPED | OUTPATIENT
Start: 2023-08-17

## 2023-08-17 RX ORDER — HYDROCHLOROTHIAZIDE 25 MG/1
TABLET ORAL
Qty: 90 TABLET | Refills: 0 | OUTPATIENT
Start: 2023-08-17

## 2023-08-25 ENCOUNTER — OFFICE VISIT (OUTPATIENT)
Dept: CARDIOLOGY | Age: 63
End: 2023-08-25

## 2023-08-25 VITALS
SYSTOLIC BLOOD PRESSURE: 118 MMHG | HEIGHT: 73 IN | WEIGHT: 202 LBS | BODY MASS INDEX: 26.77 KG/M2 | DIASTOLIC BLOOD PRESSURE: 76 MMHG | HEART RATE: 88 BPM

## 2023-08-25 DIAGNOSIS — I10 ESSENTIAL HYPERTENSION: Primary | ICD-10-CM

## 2023-08-25 PROCEDURE — 3074F SYST BP LT 130 MM HG: CPT | Performed by: INTERNAL MEDICINE

## 2023-08-25 PROCEDURE — 3078F DIAST BP <80 MM HG: CPT | Performed by: INTERNAL MEDICINE

## 2023-08-25 PROCEDURE — 99214 OFFICE O/P EST MOD 30 MIN: CPT | Performed by: INTERNAL MEDICINE

## 2023-08-30 DIAGNOSIS — L30.9 ECZEMA, UNSPECIFIED TYPE: ICD-10-CM

## 2023-08-31 RX ORDER — TRIAMCINOLONE ACETONIDE 5 MG/G
CREAM TOPICAL
Qty: 30 G | Refills: 2 | Status: SHIPPED | OUTPATIENT
Start: 2023-08-31

## 2023-09-05 DIAGNOSIS — I10 ESSENTIAL HYPERTENSION: ICD-10-CM

## 2023-09-05 RX ORDER — LOSARTAN POTASSIUM 100 MG/1
100 TABLET ORAL DAILY
Qty: 30 TABLET | Refills: 0 | OUTPATIENT
Start: 2023-09-05

## 2023-09-21 ENCOUNTER — OFFICE VISIT (OUTPATIENT)
Dept: FAMILY MEDICINE CLINIC | Facility: CLINIC | Age: 63
End: 2023-09-21
Payer: COMMERCIAL

## 2023-09-21 VITALS
HEART RATE: 96 BPM | SYSTOLIC BLOOD PRESSURE: 138 MMHG | BODY MASS INDEX: 27.63 KG/M2 | OXYGEN SATURATION: 97 % | HEIGHT: 73 IN | RESPIRATION RATE: 17 BRPM | WEIGHT: 208.5 LBS | DIASTOLIC BLOOD PRESSURE: 88 MMHG

## 2023-09-21 DIAGNOSIS — I10 ESSENTIAL HYPERTENSION: ICD-10-CM

## 2023-09-21 DIAGNOSIS — N40.1 BENIGN PROSTATIC HYPERPLASIA WITH URINARY FREQUENCY: ICD-10-CM

## 2023-09-21 DIAGNOSIS — Z12.5 SCREENING FOR PROSTATE CANCER: ICD-10-CM

## 2023-09-21 DIAGNOSIS — Z23 NEED FOR VACCINATION: ICD-10-CM

## 2023-09-21 DIAGNOSIS — Z23 NEED FOR PNEUMOCOCCAL 20-VALENT CONJUGATE VACCINATION: ICD-10-CM

## 2023-09-21 DIAGNOSIS — R35.0 BENIGN PROSTATIC HYPERPLASIA WITH URINARY FREQUENCY: ICD-10-CM

## 2023-09-21 DIAGNOSIS — E78.00 HYPERCHOLESTEROLEMIA: ICD-10-CM

## 2023-09-21 DIAGNOSIS — Z00.00 WELCOME TO MEDICARE PREVENTIVE VISIT: Primary | ICD-10-CM

## 2023-09-21 LAB
ATRIAL RATE: 87 BPM
P AXIS: 53 DEGREES
P-R INTERVAL: 178 MS
Q-T INTERVAL: 352 MS
QRS DURATION: 88 MS
QTC CALCULATION (BEZET): 423 MS
R AXIS: -14 DEGREES
T AXIS: -9 DEGREES
VENTRICULAR RATE: 87 BPM

## 2023-09-21 PROCEDURE — G0009 ADMIN PNEUMOCOCCAL VACCINE: HCPCS | Performed by: EMERGENCY MEDICINE

## 2023-09-21 PROCEDURE — G0403 EKG FOR INITIAL PREVENT EXAM: HCPCS | Performed by: EMERGENCY MEDICINE

## 2023-09-21 PROCEDURE — 90662 IIV NO PRSV INCREASED AG IM: CPT | Performed by: EMERGENCY MEDICINE

## 2023-09-21 PROCEDURE — 3008F BODY MASS INDEX DOCD: CPT | Performed by: EMERGENCY MEDICINE

## 2023-09-21 PROCEDURE — G0402 INITIAL PREVENTIVE EXAM: HCPCS | Performed by: EMERGENCY MEDICINE

## 2023-09-21 PROCEDURE — G0008 ADMIN INFLUENZA VIRUS VAC: HCPCS | Performed by: EMERGENCY MEDICINE

## 2023-09-21 PROCEDURE — 3079F DIAST BP 80-89 MM HG: CPT | Performed by: EMERGENCY MEDICINE

## 2023-09-21 PROCEDURE — 90677 PCV20 VACCINE IM: CPT | Performed by: EMERGENCY MEDICINE

## 2023-09-21 PROCEDURE — 96160 PT-FOCUSED HLTH RISK ASSMT: CPT | Performed by: EMERGENCY MEDICINE

## 2023-09-21 PROCEDURE — 3075F SYST BP GE 130 - 139MM HG: CPT | Performed by: EMERGENCY MEDICINE

## 2023-09-21 RX ORDER — HYDROCHLOROTHIAZIDE 25 MG/1
25 TABLET ORAL DAILY
Qty: 90 TABLET | Refills: 1 | Status: CANCELLED
Start: 2023-09-21

## 2023-09-21 RX ORDER — LOSARTAN POTASSIUM 100 MG/1
100 TABLET ORAL DAILY
Qty: 90 TABLET | Refills: 1 | Status: CANCELLED
Start: 2023-09-21

## 2023-09-21 RX ORDER — LOSARTAN POTASSIUM 100 MG/1
100 TABLET ORAL DAILY
Qty: 90 TABLET | Refills: 1 | Status: SHIPPED | OUTPATIENT
Start: 2023-09-21

## 2023-09-21 RX ORDER — TAMSULOSIN HYDROCHLORIDE 0.4 MG/1
0.4 CAPSULE ORAL DAILY
Qty: 90 CAPSULE | Refills: 1 | Status: SHIPPED | OUTPATIENT
Start: 2023-09-21

## 2023-09-21 RX ORDER — HYDROCHLOROTHIAZIDE 25 MG/1
25 TABLET ORAL DAILY
Qty: 90 TABLET | Refills: 1 | Status: SHIPPED | OUTPATIENT
Start: 2023-09-21

## 2023-10-27 ENCOUNTER — EXTERNAL LAB (OUTPATIENT)
Dept: OTHER | Age: 63
End: 2023-10-27

## 2023-10-27 ENCOUNTER — LAB ENCOUNTER (OUTPATIENT)
Dept: LAB | Age: 63
End: 2023-10-27
Attending: EMERGENCY MEDICINE

## 2023-10-27 DIAGNOSIS — Z12.5 SCREENING FOR PROSTATE CANCER: ICD-10-CM

## 2023-10-27 DIAGNOSIS — Z00.00 WELCOME TO MEDICARE PREVENTIVE VISIT: ICD-10-CM

## 2023-10-27 LAB
ALBUMIN SERPL-MCNC: 3.6 G/DL (ref 3.4–5)
ALBUMIN SERPL-MCNC: 3.6 G/DL (ref 3.4–5)
ALBUMIN/GLOB SERPL: 1.2 {RATIO} (ref 1–2)
ALBUMIN/GLOB SERPL: 1.2 {RATIO} (ref 1–2)
ALP LIVER SERPL-CCNC: 62 U/L
ALP SERPL-CCNC: 62 U/L (ref 45–117)
ALT SERPL-CCNC: 28 U/L
ALT SERPL-CCNC: 28 U/L
ANION GAP SERPL CALC-SCNC: 3 MMOL/L (ref 0–18)
ANION GAP SERPL CALC-SCNC: 3 MMOL/L (ref 0–18)
AST SERPL-CCNC: 19 U/L (ref 15–37)
AST SERPL-CCNC: 19 U/L (ref 15–37)
BASOPHILS # BLD AUTO: 0.03 X10(3) UL (ref 0–0.2)
BASOPHILS NFR BLD AUTO: 0.5 %
BILIRUB SERPL-MCNC: 1 MG/DL (ref 0.1–2)
BILIRUB SERPL-MCNC: 1 MG/DL (ref 0.1–2)
BUN BLD-MCNC: 13 MG/DL (ref 7–18)
BUN SERPL-MCNC: 13 MG/DL (ref 7–18)
CALCIUM BLD-MCNC: 9 MG/DL (ref 8.5–10.1)
CALCIUM SERPL-MCNC: ABNORMAL MG/DL (ref 8.5–10.1)
CALCULATED OSMO: 290 MOSM/KG (ref 275–295)
CHLORIDE SERPL-SCNC: 108 MMOL/L
CHLORIDE SERPL-SCNC: 108 MMOL/L (ref 98–112)
CHOLEST SERPL-MCNC: 123 MG/DL
CHOLEST SERPL-MCNC: 123 MG/DL (ref ?–200)
CO2 SERPL-SCNC: 29 MMOL/L (ref 21–32)
CO2 SERPL-SCNC: 29 MMOL/L (ref 21–32)
COMPLEXED PSA SERPL-MCNC: 0.85 NG/ML (ref ?–4)
CREAT BLD-MCNC: 1.32 MG/DL
CREAT SERPL-MCNC: 1.32 MG/DL (ref 0.7–1.3)
EGFRCR SERPLBLD CKD-EPI 2021: 60 ML/MIN/1.73M2 (ref 60–?)
EOSINOPHIL # BLD AUTO: 0.11 X10(3) UL (ref 0–0.7)
EOSINOPHIL NFR BLD AUTO: 1.7 %
ERYTHROCYTE [DISTWIDTH] IN BLOOD BY AUTOMATED COUNT: 13 %
FASTING PATIENT LIPID ANSWER: YES
FASTING STATUS PATIENT QL REPORTED: YES
GFR SERPLBLD SCHWARTZ-ARVRAT: ABNORMAL ML/MIN/1.73M2
GLOBULIN PLAS-MCNC: 3.1 G/DL (ref 2.8–4.4)
GLOBULIN SER-MCNC: 3.1 G/DL
GLUCOSE BLD-MCNC: 97 MG/DL (ref 70–99)
GLUCOSE SERPL-MCNC: ABNORMAL MG/DL
HCT VFR BLD AUTO: 38.6 %
HDLC SERPL-MCNC: 41 MG/DL (ref 40–59)
HDLC SERPL-MCNC: 41 MG/DL (ref 40–59)
HGB BLD-MCNC: 12.9 G/DL
IMM GRANULOCYTES # BLD AUTO: 0.02 X10(3) UL (ref 0–1)
IMM GRANULOCYTES NFR BLD: 0.3 %
LDLC SERPL CALC-MCNC: 64 MG/DL
LDLC SERPL CALC-MCNC: 64 MG/DL (ref ?–100)
LENGTH OF FAST TIME PATIENT: YES H
LENGTH OF FAST TIME PATIENT: YES H
LYMPHOCYTES # BLD AUTO: 1.36 X10(3) UL (ref 1–4)
LYMPHOCYTES NFR BLD AUTO: 21.4 %
MCH RBC QN AUTO: 29.9 PG (ref 26–34)
MCHC RBC AUTO-ENTMCNC: 33.4 G/DL (ref 31–37)
MCV RBC AUTO: 89.6 FL
MONOCYTES # BLD AUTO: 0.54 X10(3) UL (ref 0.1–1)
MONOCYTES NFR BLD AUTO: 8.5 %
NEUTROPHILS # BLD AUTO: 4.3 X10 (3) UL (ref 1.5–7.7)
NEUTROPHILS # BLD AUTO: 4.3 X10(3) UL (ref 1.5–7.7)
NEUTROPHILS NFR BLD AUTO: 67.6 %
NONHDLC SERPL-MCNC: 82 MG/DL (ref ?–130)
NONHDLC SERPL-MCNC: NORMAL MG/DL
OSMOLALITY SERPL CALC.SUM OF ELEC: 290 MOSM/KG (ref 275–295)
PLATELET # BLD AUTO: 247 10(3)UL (ref 150–450)
POTASSIUM SERPL-SCNC: 3.4 MMOL/L (ref 3.5–5.1)
POTASSIUM SERPL-SCNC: 3.4 MMOL/L (ref 3.5–5.1)
PROT SERPL-MCNC: 6.7 G/DL (ref 6.4–8.2)
PROT SERPL-MCNC: 6.7 G/DL (ref 6.4–8.2)
RBC # BLD AUTO: 4.31 X10(6)UL
SODIUM SERPL-SCNC: 140 MMOL/L (ref 136–145)
SODIUM SERPL-SCNC: 140 MMOL/L (ref 136–145)
TRIGL SERPL-MCNC: 92 MG/DL (ref 30–149)
TRIGL SERPL-MCNC: 92 MG/DL (ref 30–149)
TSI SER-ACNC: 1.33 MIU/ML (ref 0.36–3.74)
VLDLC SERPL CALC-MCNC: 14 MG/DL (ref 0–30)
VLDLC SERPL CALC-MCNC: 14 MG/DL (ref 0–30)
WBC # BLD AUTO: 6.4 X10(3) UL (ref 4–11)

## 2023-10-27 PROCEDURE — 85025 COMPLETE CBC W/AUTO DIFF WBC: CPT

## 2023-10-27 PROCEDURE — 80053 COMPREHEN METABOLIC PANEL: CPT

## 2023-10-27 PROCEDURE — 36415 COLL VENOUS BLD VENIPUNCTURE: CPT

## 2023-10-27 PROCEDURE — 80061 LIPID PANEL: CPT

## 2023-10-27 PROCEDURE — 84443 ASSAY THYROID STIM HORMONE: CPT

## 2023-10-31 ENCOUNTER — TELEPHONE (OUTPATIENT)
Dept: CARDIOLOGY | Age: 63
End: 2023-10-31

## 2023-10-31 DIAGNOSIS — I10 ESSENTIAL HYPERTENSION: Primary | ICD-10-CM

## 2023-10-31 DIAGNOSIS — E78.2 MIXED HYPERLIPIDEMIA: ICD-10-CM

## 2023-11-01 ENCOUNTER — E-ADVICE (OUTPATIENT)
Dept: CARDIOLOGY | Age: 63
End: 2023-11-01

## 2023-11-24 NOTE — H&P
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 He presents as a consult with:  1. BPH/LUTS  - flomax 9/21/23  2. OAB/frequency  3. ED    PCP - Roxann    Reports 1 y h/o LUTS which is stable  Prior BPH/OAB meds: flomax 9/21/23 which partially helps    Takes HCTZ which contributes to frequency. AUA SS is 13/35 with 3 ASA, f, I; 2 u; 1 w, n. Mostly happy with LUTS. Incontinence: UI/dribbles once a day, doesn't need to change underwear  Penoscrotal: no abnormalities  LUIS E: ~ 40 g prostate, no nodules or tenderness    UA is negative and PVR is 29 mL    UTI hx: none  Gross hematuria: none  Tobacco hx: 20 pack years, quit 2021  Kidney stone hx: none  Fam h/o  malignancy: none    50% potency. Took viagra many y ago and not sure if it helped. Discussed both viagra and cialis as options and reviewed the risks and benefits     PSA 0.85 10/27/23     Drinks ~ 40 oz water, 20 oz coffee with medium yellow urine. We reviewed OAB tx and prevention strategies at today's visit and I provided educational materials for this. I encouraged the patient to drink at least 40-60 oz water per day or enough to keep urine clear. I also reviewed dietary irritants and provided educational materials for this. We also discussed trying prn AZO for pain relief with plenty of water. We discussed Kegel exercises for incontinence. I provided and reviewed educational materials for this. We discussed proscar in addition to flomax as options for LUTS and reviewed SEs. He would like to leave things alone. He will increase water intake and cut back on juice for OAB, try Kegels for mild UI. Continue flomax for BPH/LUTS. Declines OAB meds for now. Trial 100 mg viagra prn.     HISTORY:  Past Medical History:   Diagnosis Date    Sleep apnea     Unspecified essential hypertension       Past Surgical History:   Procedure Laterality Date    EYE SURGERY      FLUOR KASSIDY & Kelby Grant NDL/CATH SPI DX/THER NJX  6/26/2014    Procedure: ;  Surgeon: Giovana Shaw MD;  Location: [address] GID & Rannie Ogles NDL/CATH SPI DX/THER Jason Jaleel Chen 82 N/A 7/17/2014    Procedure: CERVICAL EPIDURAL;  Surgeon: Giovana Shaw MD;  Location: [address] G[facility] [address] Rannie Ogles NDL/CATH SPI DX/THER Jason Jaleel Chen 82 N/A 8/7/2014    Procedure: CERVICAL EPIDURAL;  Surgeon: Giovana Shaw MD;  Location: [facility]    INJECTION, W/WO CONTRAST, DX/THERAPEUTIC SUBSTANCE, EPIDURAL/SUBARACHNOID; CERVICAL/THORACIC N/A 6/26/2014    Procedure: CERVICAL EPIDURAL;  Surgeon: Giovana Shaw MD;  Location: [facility]    INJECTION, W/WO CONTRAST, DX/THERAPEUTIC SUBSTANCE, EPIDURAL/SUBARACHNOID; CERVICAL/THORACIC N/A 7/17/2014    Procedure: CERVICAL EPIDURAL;  Surgeon: Giovana Shaw MD;  Location: [facility]    INJECTION, W/WO CONTRAST, DX/THERAPEUTIC SUBSTANCE, EPIDURAL/SUBARACHNOID; CERVICAL/THORACIC N/A 8/7/2014    Procedure: CERVICAL EPIDURAL;  Surgeon: Giovana Shaw MD;  Location: [facility]    REPAIR ROTATOR CUFF,ACUTE        Family History   Problem Relation Age of Onset    Hypertension Mother       Social History:   Social History     Socioeconomic History    Marital status:    Tobacco Use    Smoking status: Former    Smokeless tobacco: Never   Vaping Use    Vaping Use: Never used   Substance and Sexual Activity    Alcohol use: Yes     Comment: occ    Drug use: No        Medications (Active prior to today's visit):  Current Outpatient Medications   Medication Sig Dispense Refill    Sildenafil Citrate 100 MG Oral Tab Take 1 tablet (100 mg total) by mouth daily as needed for Erectile Dysfunction. Take ~ one hour prior to sexual activity on an empty stomach 30 tablet 5    hydroCHLOROthiazide 25 MG Oral Tab Take 1 tablet (25 mg total) by mouth daily.  90 tablet 1    losartan 100 MG Oral Tab Take 1 tablet (100 mg total) by mouth daily. 90 tablet 1    tamsulosin 0.4 MG Oral Cap Take 1 capsule (0.4 mg total) by mouth daily. 90 capsule 1    triamcinolone 0.5 % External Cream APPLY TOPICALLY TO THE AFFECTED AREA TWICE DAILY. DO NOT USE FOR NO MORE THAN 14 DAYS CONTINUOUSLY 30 g 2    Hyoscyamine Sulfate (LEVSIN) 0.125 MG Oral Tab Take 1 tablet (125 mcg total) by mouth every 4 (four) hours as needed for Cramping. 15 tablet 0    atorvastatin 10 MG Oral Tab Take 1 tablet (10 mg total) by mouth. Multiple Vitamin (MULTI-VITAMIN DAILY) Oral Tab Take by mouth. Allergies:  No Known Allergies      ROS:     A comprehensive 10 point review of systems was completed. Pertinent positives and negatives noted in the the HPI. PHYSICAL EXAM:     GENERAL APPEARANCE: well, developed, well nourished, in no acute distress  NEUROLOGIC: nonfocal, alert and oriented  HEAD: normocephalic, atraumatic  EYES: sclera non-icteric  EARS: hearing intact  ORAL CAVITY: mucosa moist  NECK/THYROID: no obvious goiter or masses  LUNGS: nonlabored breathing  ABDOMEN: soft, no obvious masses or tenderness  SKIN: no obvious rashes    : as noted above    ASSESSMENT/PLAN:   Diagnoses and all orders for this visit:    BPH with obstruction/lower urinary tract symptoms    Urinary frequency    Erectile dysfunction, unspecified erectile dysfunction type  -     Sildenafil Citrate 100 MG Oral Tab; Take 1 tablet (100 mg total) by mouth daily as needed for Erectile Dysfunction. Take ~ one hour prior to sexual activity on an empty stomach      - as noted above. Thanks again for this consult.     Dann Vargas MD, [facility]  Urologist  [facility]  Office: [phone]

## 2023-12-04 ENCOUNTER — OFFICE VISIT (OUTPATIENT)
Dept: SURGERY | Facility: CLINIC | Age: 63
End: 2023-12-04
Payer: COMMERCIAL

## 2023-12-04 DIAGNOSIS — N52.9 ERECTILE DYSFUNCTION, UNSPECIFIED ERECTILE DYSFUNCTION TYPE: ICD-10-CM

## 2023-12-04 DIAGNOSIS — N40.1 BPH WITH OBSTRUCTION/LOWER URINARY TRACT SYMPTOMS: Primary | ICD-10-CM

## 2023-12-04 DIAGNOSIS — N13.8 BPH WITH OBSTRUCTION/LOWER URINARY TRACT SYMPTOMS: Primary | ICD-10-CM

## 2023-12-04 DIAGNOSIS — R35.0 URINARY FREQUENCY: ICD-10-CM

## 2023-12-04 LAB
APPEARANCE: CLEAR
BILIRUBIN: NEGATIVE
GLUCOSE (URINE DIPSTICK): NEGATIVE MG/DL
KETONES (URINE DIPSTICK): NEGATIVE MG/DL
LEUKOCYTES: NEGATIVE
MULTISTIX LOT#: NORMAL NUMERIC
NITRITE, URINE: NEGATIVE
OCCULT BLOOD: NEGATIVE
PH, URINE: 5.5 (ref 4.5–8)
PROTEIN (URINE DIPSTICK): NEGATIVE MG/DL
SPECIFIC GRAVITY: 1.03 (ref 1–1.03)
URINE-COLOR: YELLOW
UROBILINOGEN,SEMI-QN: 0.2 MG/DL (ref 0–1.9)

## 2023-12-04 RX ORDER — SILDENAFIL 100 MG/1
100 TABLET, FILM COATED ORAL
Qty: 30 TABLET | Refills: 5 | Status: SHIPPED | OUTPATIENT
Start: 2023-12-04

## 2024-02-23 ENCOUNTER — APPOINTMENT (OUTPATIENT)
Dept: CARDIOLOGY | Age: 64
End: 2024-02-23

## 2024-02-28 ENCOUNTER — APPOINTMENT (OUTPATIENT)
Dept: CARDIOLOGY | Age: 64
End: 2024-02-28

## 2024-02-28 VITALS
HEIGHT: 73 IN | DIASTOLIC BLOOD PRESSURE: 75 MMHG | SYSTOLIC BLOOD PRESSURE: 120 MMHG | WEIGHT: 207 LBS | BODY MASS INDEX: 27.43 KG/M2 | HEART RATE: 76 BPM

## 2024-02-28 DIAGNOSIS — R06.09 DOE (DYSPNEA ON EXERTION): ICD-10-CM

## 2024-02-28 DIAGNOSIS — I10 ESSENTIAL HYPERTENSION: Primary | ICD-10-CM

## 2024-02-28 DIAGNOSIS — E78.2 MIXED HYPERLIPIDEMIA: ICD-10-CM

## 2024-02-28 SDOH — HEALTH STABILITY: PHYSICAL HEALTH: ON AVERAGE, HOW MANY DAYS PER WEEK DO YOU ENGAGE IN MODERATE TO STRENUOUS EXERCISE (LIKE A BRISK WALK)?: 2 DAYS

## 2024-02-28 SDOH — HEALTH STABILITY: PHYSICAL HEALTH: ON AVERAGE, HOW MANY MINUTES DO YOU ENGAGE IN EXERCISE AT THIS LEVEL?: 60 MIN

## 2024-02-28 ASSESSMENT — PATIENT HEALTH QUESTIONNAIRE - PHQ9
1. LITTLE INTEREST OR PLEASURE IN DOING THINGS: NOT AT ALL
CLINICAL INTERPRETATION OF PHQ2 SCORE: NO FURTHER SCREENING NEEDED
2. FEELING DOWN, DEPRESSED OR HOPELESS: NOT AT ALL
SUM OF ALL RESPONSES TO PHQ9 QUESTIONS 1 AND 2: 0
SUM OF ALL RESPONSES TO PHQ9 QUESTIONS 1 AND 2: 0

## 2024-03-04 ENCOUNTER — TELEPHONE (OUTPATIENT)
Dept: CT IMAGING | Age: 64
End: 2024-03-04

## 2024-03-07 ENCOUNTER — E-ADVICE (OUTPATIENT)
Dept: CT IMAGING | Age: 64
End: 2024-03-07

## 2024-03-07 RX ORDER — METOPROLOL TARTRATE 50 MG/1
50 TABLET, FILM COATED ORAL SEE ADMIN INSTRUCTIONS
Qty: 2 TABLET | Refills: 0 | Status: SHIPPED | OUTPATIENT
Start: 2024-03-07

## 2024-03-09 ENCOUNTER — MED REC SCAN ONLY (OUTPATIENT)
Dept: FAMILY MEDICINE CLINIC | Facility: CLINIC | Age: 64
End: 2024-03-09

## 2024-03-11 ENCOUNTER — APPOINTMENT (OUTPATIENT)
Dept: URBAN - METROPOLITAN AREA CLINIC 247 | Age: 64
Setting detail: DERMATOLOGY
End: 2024-03-11

## 2024-03-11 DIAGNOSIS — L72.8 OTHER FOLLICULAR CYSTS OF THE SKIN AND SUBCUTANEOUS TISSUE: ICD-10-CM

## 2024-03-11 DIAGNOSIS — L20.89 OTHER ATOPIC DERMATITIS: ICD-10-CM

## 2024-03-11 PROCEDURE — OTHER MIPS QUALITY: OTHER

## 2024-03-11 PROCEDURE — 99213 OFFICE O/P EST LOW 20 MIN: CPT

## 2024-03-11 PROCEDURE — OTHER COUNSELING: OTHER

## 2024-03-11 PROCEDURE — OTHER PRESCRIPTION MEDICATION MANAGEMENT: OTHER

## 2024-03-11 PROCEDURE — OTHER PRESCRIPTION: OTHER

## 2024-03-11 RX ORDER — MINOCYCLINE HYDROCHLORIDE 100 MG/1
CAPSULE ORAL
Qty: 60 | Refills: 0 | Status: ERX | COMMUNITY
Start: 2024-03-11

## 2024-03-11 RX ORDER — CLOBETASOL PROPIONATE 0.5 MG/G
OINTMENT TOPICAL
Qty: 60 | Refills: 2 | Status: ERX | COMMUNITY
Start: 2024-03-11

## 2024-03-11 ASSESSMENT — LOCATION SIMPLE DESCRIPTION DERM
LOCATION SIMPLE: RIGHT THIGH
LOCATION SIMPLE: LEFT UPPER ARM
LOCATION SIMPLE: RIGHT CHEEK
LOCATION SIMPLE: RIGHT UPPER ARM
LOCATION SIMPLE: LEFT THIGH

## 2024-03-11 ASSESSMENT — LOCATION DETAILED DESCRIPTION DERM
LOCATION DETAILED: LEFT DISTAL POSTERIOR UPPER ARM
LOCATION DETAILED: RIGHT ANTERIOR DISTAL THIGH
LOCATION DETAILED: LEFT ANTERIOR DISTAL THIGH
LOCATION DETAILED: RIGHT LATERAL BUCCAL CHEEK
LOCATION DETAILED: RIGHT DISTAL POSTERIOR UPPER ARM

## 2024-03-11 ASSESSMENT — LOCATION ZONE DERM
LOCATION ZONE: LEG
LOCATION ZONE: ARM
LOCATION ZONE: FACE

## 2024-03-11 NOTE — PROCEDURE: PRESCRIPTION MEDICATION MANAGEMENT
Initiate Treatment: Clobetasol 0.05% ointment BID x 2 weeks
Continue Regimen: Triamcinolone 0.1% topical cream BID, PRN for flares
Render In Strict Bullet Format?: No
Detail Level: Zone
Initiate Treatment: Minocycline 100mg BID with food x 4-6 weeks

## 2024-03-11 NOTE — PROCEDURE: MIPS QUALITY
Quality 431: Preventive Care And Screening: Unhealthy Alcohol Use - Screening: Patient not identified as an unhealthy alcohol user when screened for unhealthy alcohol use using a systematic screening method
Quality 226: Preventive Care And Screening: Tobacco Use: Screening And Cessation Intervention: Patient screened for tobacco use and is an ex/non-smoker
Quality 130: Documentation Of Current Medications In The Medical Record: Current Medications Documented
Detail Level: Detailed
Quality 47: Advance Care Plan: Advance care planning not documented, reason not otherwise specified.

## 2024-03-12 DIAGNOSIS — I10 ESSENTIAL HYPERTENSION: ICD-10-CM

## 2024-03-13 RX ORDER — HYDROCHLOROTHIAZIDE 25 MG/1
25 TABLET ORAL DAILY
Qty: 90 TABLET | Refills: 1 | Status: SHIPPED | OUTPATIENT
Start: 2024-03-13

## 2024-03-13 RX ORDER — LOSARTAN POTASSIUM 100 MG/1
100 TABLET ORAL DAILY
Qty: 90 TABLET | Refills: 1 | Status: SHIPPED | OUTPATIENT
Start: 2024-03-13

## 2024-03-21 ENCOUNTER — TELEPHONE (OUTPATIENT)
Dept: CT IMAGING | Age: 64
End: 2024-03-21

## 2024-04-04 ENCOUNTER — OFFICE VISIT (OUTPATIENT)
Dept: FAMILY MEDICINE CLINIC | Facility: CLINIC | Age: 64
End: 2024-04-04
Payer: COMMERCIAL

## 2024-04-04 VITALS
RESPIRATION RATE: 19 BRPM | OXYGEN SATURATION: 97 % | BODY MASS INDEX: 27.73 KG/M2 | HEIGHT: 73 IN | WEIGHT: 209.25 LBS | HEART RATE: 87 BPM | DIASTOLIC BLOOD PRESSURE: 78 MMHG | SYSTOLIC BLOOD PRESSURE: 136 MMHG

## 2024-04-04 DIAGNOSIS — E78.00 HYPERCHOLESTEROLEMIA: ICD-10-CM

## 2024-04-04 DIAGNOSIS — R06.09 DYSPNEA ON EXERTION: ICD-10-CM

## 2024-04-04 DIAGNOSIS — N28.9 RENAL INSUFFICIENCY: Primary | ICD-10-CM

## 2024-04-04 DIAGNOSIS — I10 ESSENTIAL HYPERTENSION: ICD-10-CM

## 2024-04-04 PROCEDURE — 1160F RVW MEDS BY RX/DR IN RCRD: CPT | Performed by: EMERGENCY MEDICINE

## 2024-04-04 PROCEDURE — 3078F DIAST BP <80 MM HG: CPT | Performed by: EMERGENCY MEDICINE

## 2024-04-04 PROCEDURE — 1159F MED LIST DOCD IN RCRD: CPT | Performed by: EMERGENCY MEDICINE

## 2024-04-04 PROCEDURE — 3008F BODY MASS INDEX DOCD: CPT | Performed by: EMERGENCY MEDICINE

## 2024-04-04 PROCEDURE — 3075F SYST BP GE 130 - 139MM HG: CPT | Performed by: EMERGENCY MEDICINE

## 2024-04-04 PROCEDURE — 99214 OFFICE O/P EST MOD 30 MIN: CPT | Performed by: EMERGENCY MEDICINE

## 2024-04-04 PROCEDURE — G2211 COMPLEX E/M VISIT ADD ON: HCPCS | Performed by: EMERGENCY MEDICINE

## 2024-04-04 RX ORDER — METOPROLOL TARTRATE 50 MG/1
50 TABLET, FILM COATED ORAL
COMMUNITY
Start: 2024-03-07

## 2024-04-04 RX ORDER — CLOBETASOL PROPIONATE 0.5 MG/G
1 OINTMENT TOPICAL 2 TIMES DAILY PRN
COMMUNITY
Start: 2024-03-11

## 2024-04-04 NOTE — PATIENT INSTRUCTIONS
Thank you for choosing [facility]  To Do:  FOR PUJA BRADLEY    Continue follow up cardiology  Continue with all medications  Have blood test done to recheck creatinine  Follow up in sept for annual wellness

## 2024-04-08 ENCOUNTER — TELEPHONE (OUTPATIENT)
Dept: CT IMAGING | Age: 64
End: 2024-04-08

## 2024-04-09 ENCOUNTER — TELEPHONE (OUTPATIENT)
Dept: CT IMAGING | Age: 64
End: 2024-04-09

## 2024-04-09 ENCOUNTER — LAB SERVICES (OUTPATIENT)
Dept: FAMILY MEDICINE | Age: 64
End: 2024-04-09

## 2024-04-09 DIAGNOSIS — E78.2 MIXED HYPERLIPIDEMIA: ICD-10-CM

## 2024-04-09 DIAGNOSIS — I10 ESSENTIAL HYPERTENSION: ICD-10-CM

## 2024-04-10 ENCOUNTER — HOSPITAL ENCOUNTER (OUTPATIENT)
Dept: CT IMAGING | Age: 64
End: 2024-04-10
Attending: INTERNAL MEDICINE

## 2024-04-10 ENCOUNTER — HOSPITAL ENCOUNTER (OUTPATIENT)
Dept: CT IMAGING | Age: 64
Discharge: HOME | End: 2024-04-10
Attending: INTERNAL MEDICINE

## 2024-04-10 VITALS — DIASTOLIC BLOOD PRESSURE: 82 MMHG | SYSTOLIC BLOOD PRESSURE: 132 MMHG | HEART RATE: 64 BPM

## 2024-04-10 DIAGNOSIS — E78.2 MIXED HYPERLIPIDEMIA: ICD-10-CM

## 2024-04-10 DIAGNOSIS — I10 ESSENTIAL HYPERTENSION: ICD-10-CM

## 2024-04-10 LAB
ANION GAP SERPL CALC-SCNC: 12 MMOL/L (ref 7–19)
BUN SERPL-MCNC: 16 MG/DL (ref 6–20)
BUN/CREAT SERPL: 15 (ref 7–25)
CALCIUM SERPL-MCNC: 9.1 MG/DL (ref 8.4–10.2)
CHLORIDE SERPL-SCNC: 102 MMOL/L (ref 97–110)
CO2 SERPL-SCNC: 31 MMOL/L (ref 21–32)
CREAT SERPL-MCNC: 1.04 MG/DL (ref 0.67–1.17)
EGFRCR SERPLBLD CKD-EPI 2021: 79 ML/MIN/{1.73_M2}
FASTING DURATION TIME PATIENT: NORMAL H
GLUCOSE SERPL-MCNC: 87 MG/DL (ref 70–99)
POTASSIUM SERPL-SCNC: 3.9 MMOL/L (ref 3.4–5.1)
SODIUM SERPL-SCNC: 141 MMOL/L (ref 135–145)

## 2024-04-10 PROCEDURE — 75574 CT ANGIO HRT W/3D IMAGE: CPT

## 2024-04-10 PROCEDURE — 75574 CT ANGIO HRT W/3D IMAGE: CPT | Performed by: INTERNAL MEDICINE

## 2024-04-10 PROCEDURE — 10002803 HB RX 637: Performed by: INTERNAL MEDICINE

## 2024-04-10 PROCEDURE — 10002805 HB CONTRAST AGENT: Performed by: INTERNAL MEDICINE

## 2024-04-10 PROCEDURE — 10002803 HB RX 637

## 2024-04-10 RX ORDER — NITROGLYCERIN 0.4 MG/1
TABLET SUBLINGUAL PRN
Status: COMPLETED | OUTPATIENT
Start: 2024-04-10 | End: 2024-04-10

## 2024-04-10 RX ADMIN — IVABRADINE 15 MG: 5 TABLET, FILM COATED ORAL at 12:34

## 2024-04-10 RX ADMIN — METOPROLOL TARTRATE 50 MG: 25 TABLET, FILM COATED ORAL at 12:35

## 2024-04-10 RX ADMIN — IOHEXOL 80 ML: 350 INJECTION, SOLUTION INTRAVENOUS at 13:46

## 2024-04-10 RX ADMIN — NITROGLYCERIN 0.4 MG: 0.4 TABLET SUBLINGUAL at 13:39

## 2024-05-10 RX ORDER — ATORVASTATIN CALCIUM 10 MG/1
10 TABLET, FILM COATED ORAL DAILY
Qty: 90 TABLET | Refills: 1 | Status: SHIPPED | OUTPATIENT
Start: 2024-05-10

## 2024-06-09 DIAGNOSIS — L30.9 ECZEMA, UNSPECIFIED TYPE: ICD-10-CM

## 2024-06-15 RX ORDER — TRIAMCINOLONE ACETONIDE 5 MG/G
CREAM TOPICAL
Qty: 30 G | Refills: 2 | Status: SHIPPED | OUTPATIENT
Start: 2024-06-15

## 2024-06-30 ENCOUNTER — OFFICE VISIT (OUTPATIENT)
Dept: FAMILY MEDICINE CLINIC | Facility: CLINIC | Age: 64
End: 2024-06-30

## 2024-06-30 VITALS
HEART RATE: 78 BPM | WEIGHT: 206 LBS | DIASTOLIC BLOOD PRESSURE: 84 MMHG | BODY MASS INDEX: 27.3 KG/M2 | OXYGEN SATURATION: 96 % | TEMPERATURE: 99 F | SYSTOLIC BLOOD PRESSURE: 130 MMHG | HEIGHT: 73 IN | RESPIRATION RATE: 18 BRPM

## 2024-06-30 DIAGNOSIS — K11.20 SIALADENITIS: Primary | ICD-10-CM

## 2024-06-30 DIAGNOSIS — J02.9 SORE THROAT: ICD-10-CM

## 2024-06-30 PROCEDURE — 3079F DIAST BP 80-89 MM HG: CPT | Performed by: NURSE PRACTITIONER

## 2024-06-30 PROCEDURE — 1160F RVW MEDS BY RX/DR IN RCRD: CPT | Performed by: NURSE PRACTITIONER

## 2024-06-30 PROCEDURE — 99213 OFFICE O/P EST LOW 20 MIN: CPT | Performed by: NURSE PRACTITIONER

## 2024-06-30 PROCEDURE — 1159F MED LIST DOCD IN RCRD: CPT | Performed by: NURSE PRACTITIONER

## 2024-06-30 PROCEDURE — 87880 STREP A ASSAY W/OPTIC: CPT | Performed by: NURSE PRACTITIONER

## 2024-06-30 PROCEDURE — 3075F SYST BP GE 130 - 139MM HG: CPT | Performed by: NURSE PRACTITIONER

## 2024-06-30 PROCEDURE — 3008F BODY MASS INDEX DOCD: CPT | Performed by: NURSE PRACTITIONER

## 2024-06-30 RX ORDER — AMOXICILLIN AND CLAVULANATE POTASSIUM 875; 125 MG/1; MG/1
1 TABLET, FILM COATED ORAL 2 TIMES DAILY
Qty: 20 TABLET | Refills: 0 | Status: SHIPPED | OUTPATIENT
Start: 2024-06-30 | End: 2024-07-10

## 2024-06-30 NOTE — PROGRESS NOTES
CHIEF COMPLAINT:     Chief Complaint   Patient presents with    Swelling     At right side of neck under jaw area since bedtime.           HPI:   Keith Winslow is a 64 year old male presents to clinic with complaint of swelling to right side of neck. Started las night. Patient reports following associated symptoms: a little sore throat. Denies nasal congestion, cough.    Denies fever, chills, rash, nausea, headache, ear pain.  Swelling did decreased. No sob or trouble swallowing   Treating symptoms with benadryl last night.  Reports history of dry mouth  Got a dental bridge 2 weeks ago.     Current Outpatient Medications   Medication Sig Dispense Refill    amoxicillin clavulanate 875-125 MG Oral Tab Take 1 tablet by mouth 2 (two) times daily for 10 days. 20 tablet 0    TRIAMCINOLONE 0.5 % External Cream APPLY TOPICALLY TO THE AFFECTED AREA TWICE DAILY. DO NOT USE FOR. NO MORE THAN 14 DAYS CONTINUOUSLY 30 g 2    metoprolol tartrate 50 MG Oral Tab Take 1 tablet (50 mg total) by mouth. (Patient not taking: Reported on 4/4/2024)      clobetasol 0.05 % External Ointment Apply 1 Application topically 2 (two) times daily as needed. APPLY TO AFFECTED AREA      LOSARTAN 100 MG Oral Tab TAKE 1 TABLET(100 MG) BY MOUTH DAILY 90 tablet 1    HYDROCHLOROTHIAZIDE 25 MG Oral Tab TAKE 1 TABLET(25 MG) BY MOUTH DAILY 90 tablet 1    Sildenafil Citrate 100 MG Oral Tab Take 1 tablet (100 mg total) by mouth daily as needed for Erectile Dysfunction. Take ~ one hour prior to sexual activity on an empty stomach 30 tablet 5    tamsulosin 0.4 MG Oral Cap Take 1 capsule (0.4 mg total) by mouth daily. 90 capsule 1    atorvastatin 10 MG Oral Tab Take 1 tablet (10 mg total) by mouth.      Multiple Vitamin (MULTI-VITAMIN DAILY) Oral Tab Take by mouth.        Past Medical History:    Sleep apnea    Unspecified essential hypertension      Social History:  Social History     Socioeconomic History    Marital status:    Tobacco Use    Smoking  status: Former    Smokeless tobacco: Never   Vaping Use    Vaping status: Never Used   Substance and Sexual Activity    Alcohol use: Yes     Comment: occ    Drug use: No     Social Determinants of Health     Physical Activity: Medium Risk (2/28/2024)    [facility]    Exercise Vital Sign     On average, how many days per week do you engage in moderate to strenuous exercise (like a brisk walk)?: 2 days     On average, how many minutes do you engage in exercise at this level?: 60 min        REVIEW OF SYSTEMS:   GENERAL HEALTH: feels well otherwise, good appetite  SKIN: denies any unusual skin lesions or rashes  HEENT: denies ear pain, See HPI  RESPIRATORY: denies shortness of breath or wheezing  CARDIOVASCULAR: denies chest pain or palpitations   GI: denies vomiting or diarrhea  NEURO: denies dizziness or lightheadedness    EXAM:   /84   Pulse 78   Temp 98.7 °F (37.1 °C) (Oral)   Resp 18   Ht 6' 1\" (1.854 m)   Wt 206 lb (93.4 kg)   SpO2 96%   BMI 27.18 kg/m²   GENERAL: well developed, well nourished,in no apparent distress  SKIN: no rashes,no suspicious lesions  HEAD: atraumatic, normocephalic  EYES: conjunctiva clear, EOM intact  EARS: TM's clear, non-injected, no bulging, retraction, or fluid bilaterally  NOSE: nostrils patent, no exudates, nasal mucosa pink and noninflamed  THROAT: oral mucosa pink, moist. Posterior pharynx not erythematous and injected. Small right tonsil exudates. Tonsils 1/4. Breath is not malodorous.  No trismus, hoarseness, muffled voice, stridor, or uvular deviation.    NECK: supple, non-tender  LUNGS: clear to auscultation bilaterally; no wheezes, rales, or rhonchi. Breathing is non labored.  CARDIO: RRR without murmur  EXTREMITIES: no cyanosis, clubbing or edema  LYMPH: right submandibular swelling. No  posterior cervical or occipital lymphadenopathy.    Recent Results (from the past 24 hour(s))   Strep A Assay W/Optic     Collection Time: 06/30/24  9:32 AM   Result Value Ref Range    Strep Grp A Screen negative Negative    Control Line Present with a clear background (yes/no) yes Yes/No    Kit Lot # [id number] Numeric    Kit Expiration Date 5/21/25 Date         ASSESSMENT AND PLAN:   Assessment: 1.   Encounter Diagnoses   Name Primary?    Sialadenitis Yes    Sore throat      Rapid strep screen is negative     1. Sialadenitis  - amoxicillin clavulanate 875-125 MG Oral Tab; Take 1 tablet by mouth 2 (two) times daily for 10 days.  Dispense: 20 tablet; Refill: 0    2. Sore throat  - Strep A Assay W/Optic    See pt handout    Plan:   Comfort measures explained and discussed as listed in Patient Instructions  Follow up with PCP in 3-5 days if not improving, condition worsens, or fever greater than or equal to 100.4 persists for 72 hours.      See pt handout    The patient/parent indicates understanding of these issues and agrees to the plan.

## 2024-09-05 ENCOUNTER — OFFICE VISIT (OUTPATIENT)
Dept: FAMILY MEDICINE CLINIC | Facility: CLINIC | Age: 64
End: 2024-09-05
Payer: COMMERCIAL

## 2024-09-05 VITALS
OXYGEN SATURATION: 96 % | RESPIRATION RATE: 17 BRPM | DIASTOLIC BLOOD PRESSURE: 80 MMHG | BODY MASS INDEX: 27.83 KG/M2 | WEIGHT: 210 LBS | HEART RATE: 92 BPM | HEIGHT: 73 IN | SYSTOLIC BLOOD PRESSURE: 130 MMHG

## 2024-09-05 DIAGNOSIS — Z00.00 ENCOUNTER FOR ANNUAL HEALTH EXAMINATION: ICD-10-CM

## 2024-09-05 DIAGNOSIS — Z00.00 ENCOUNTER FOR ANNUAL PHYSICAL EXAM: Primary | ICD-10-CM

## 2024-09-05 DIAGNOSIS — I10 ESSENTIAL HYPERTENSION: ICD-10-CM

## 2024-09-05 DIAGNOSIS — M25.532 LEFT WRIST PAIN: ICD-10-CM

## 2024-09-05 DIAGNOSIS — J41.0 SIMPLE CHRONIC BRONCHITIS (HCC): ICD-10-CM

## 2024-09-05 PROCEDURE — 99214 OFFICE O/P EST MOD 30 MIN: CPT | Performed by: EMERGENCY MEDICINE

## 2024-09-05 PROCEDURE — 3008F BODY MASS INDEX DOCD: CPT | Performed by: EMERGENCY MEDICINE

## 2024-09-05 PROCEDURE — 1170F FXNL STATUS ASSESSED: CPT | Performed by: EMERGENCY MEDICINE

## 2024-09-05 PROCEDURE — G0438 PPPS, INITIAL VISIT: HCPCS | Performed by: EMERGENCY MEDICINE

## 2024-09-05 PROCEDURE — 3075F SYST BP GE 130 - 139MM HG: CPT | Performed by: EMERGENCY MEDICINE

## 2024-09-05 PROCEDURE — 3079F DIAST BP 80-89 MM HG: CPT | Performed by: EMERGENCY MEDICINE

## 2024-09-05 PROCEDURE — 96160 PT-FOCUSED HLTH RISK ASSMT: CPT | Performed by: EMERGENCY MEDICINE

## 2024-09-05 PROCEDURE — 1125F AMNT PAIN NOTED PAIN PRSNT: CPT | Performed by: EMERGENCY MEDICINE

## 2024-09-05 PROCEDURE — 1160F RVW MEDS BY RX/DR IN RCRD: CPT | Performed by: EMERGENCY MEDICINE

## 2024-09-05 PROCEDURE — 1159F MED LIST DOCD IN RCRD: CPT | Performed by: EMERGENCY MEDICINE

## 2024-09-05 RX ORDER — BECLOMETHASONE DIPROPIONATE HFA 40 UG/1
1 AEROSOL, METERED RESPIRATORY (INHALATION) 2 TIMES DAILY
Qty: 10.6 G | Refills: 2 | Status: SHIPPED | OUTPATIENT
Start: 2024-09-05 | End: 2025-08-31

## 2024-09-05 RX ORDER — LOSARTAN POTASSIUM 100 MG/1
100 TABLET ORAL DAILY
Qty: 90 TABLET | Refills: 1 | Status: SHIPPED | OUTPATIENT
Start: 2024-09-05

## 2024-09-05 RX ORDER — HYDROCHLOROTHIAZIDE 25 MG/1
25 TABLET ORAL DAILY
Qty: 90 TABLET | Refills: 1 | Status: SHIPPED | OUTPATIENT
Start: 2024-09-05

## 2024-09-05 NOTE — PROGRESS NOTES
Subjective:   Keith Winslow is a 64 year old male who presents for a MA AHA (Medicare Advantage Annual Health Assessment) and Medicare Initial Preventative Physical Exam (Welcome to Medicare- < 12 months on Medicare) and scheduled follow up of multiple significant but stable problems.             Quit smoking 2 years ago  Complains of a cough that he usually has in the morning.  Cough sometimes with scanty phlegm usually in the morning.  Occasional shortness of breath.  Has been followed up with cardiology who has worked up his history of shortness of breath.      HYPERTENSION  On Losartan and hydrochlorothiazide   Compliant with meds  No home BP check  No Cough  Currently following up with cardiology for Sx of SOB with exertion  Compliant with meds  No cough  Home BP checks  normotensive  Non smoker quit 2 years ago      Recently developed visual floaters Following up with ophthalmology, [facility]      History/Other:   Fall Risk Assessment:   He has been screened for Falls and is low risk.      Cognitive Assessment:   He had a completely normal cognitive assessment - see flowsheet entries     Functional Ability/Status:   Keith Winslow has some abnormal functions as listed below:  He has Vision problems based on screening of functional status.       Depression Screening (PHQ):  PHQ-2 SCORE: 0  , done 9/5/2024            Advanced Directives:   He does NOT have a Living Will. [Do you have a living will?: No]  He does NOT have a Power of  for Health Care. [Do you have a healthcare power of ?: No]  Discussed with patient        Patient Active Problem List   Diagnosis    Upper extremity weakness    Cervical spondylosis    Spinal stenosis of cervical region    DDD (degenerative disc disease), cervical    Plantar fasciitis, bilateral    Cubital tunnel syndrome on left    Eczema    Essential hypertension    Tobacco abuse counseling    Tobacco abuse    Mixed hyperlipidemia    Rash and  nonspecific skin eruption    Sialadenitis    Prostate hypertrophy     Allergies:  He has No Known Allergies.    Current Medications:  Outpatient Medications Marked as Taking for the 9/5/24 encounter (Office Visit) with Mike Hackett MD   Medication Sig    losartan 100 MG Oral Tab Take 1 tablet (100 mg total) by mouth daily.    hydroCHLOROthiazide 25 MG Oral Tab Take 1 tablet (25 mg total) by mouth daily.    Beclomethasone Diprop HFA (QVAR REDIHALER) 40 MCG/ACT Inhalation Aerosol, Breath Activated Inhale 1 puff into the lungs in the morning and 1 puff before bedtime.    TRIAMCINOLONE 0.5 % External Cream APPLY TOPICALLY TO THE AFFECTED AREA TWICE DAILY. DO NOT USE FOR. NO MORE THAN 14 DAYS CONTINUOUSLY    clobetasol 0.05 % External Ointment Apply 1 Application topically 2 (two) times daily as needed. APPLY TO AFFECTED AREA    atorvastatin 10 MG Oral Tab Take 1 tablet (10 mg total) by mouth.    Multiple Vitamin (MULTI-VITAMIN DAILY) Oral Tab Take by mouth.       Medical History:  He  has a past medical history of Sleep apnea and Unspecified essential hypertension.  Surgical History:  He  has a past surgical history that includes Eye surgery; repair rotator cuff,acute; injection, w/wo contrast, dx/therapeutic substance, epidural/subarachnoid; cervical/thoracic (N/A, 6/26/2014); fluor gid & loclzj ndl/cath spi dx/ther njx (6/26/2014); injection, w/wo contrast, dx/therapeutic substance, epidural/subarachnoid; cervical/thoracic (N/A, 7/17/2014); fluor gid & loclzj ndl/cath spi dx/ther njx (N/A, 7/17/2014); injection, w/wo contrast, dx/therapeutic substance, epidural/subarachnoid; cervical/thoracic (N/A, 8/7/2014); and fluor gid & loclzj ndl/cath spi dx/ther njx (N/A, 8/7/2014).   Family History:  His family history includes Hypertension in his mother.  Social History:  He  reports that he has quit smoking. He has never used smokeless tobacco. He reports current alcohol use. He reports that he does not use  drugs.    Tobacco:  He smoked tobacco in the past but quit greater than 12 months ago.  Social History     Tobacco Use   Smoking Status Former   Smokeless Tobacco Never          CAGE Alcohol Screen:   CAGE screening score of 0 on 9/5/2024, showing low risk of alcohol abuse.      Patient Care Team:  Mike Hackett MD as PCP - General (Family Medicine)  Vladislav Murray MD (CARDIOLOGY)  Juantio Hudson MD (UROLOGY)    Review of Systems  GENERAL: feels well otherwise  SKIN: denies any unusual skin lesions  EYES: denies blurred vision or double vision  HEENT: denies nasal congestion, sinus pain or ST  LUNGS: denies shortness of breath with exertion  CARDIOVASCULAR: denies chest pain on exertion  GI: denies abdominal pain, denies heartburn  MUSCULOSKELETAL: denies back pain  NEURO: denies headaches  PSYCHE: denies depression or anxiety  HEMATOLOGIC: denies hx of anemia  ENDOCRINE: denies thyroid history  ALL/ASTHMA: denies hx of allergy or asthma    Objective:   Physical Exam  General Appearance:  Alert, cooperative, no distress, appears stated age   Head:  Normocephalic, without obvious abnormality, atraumatic   Eyes:  PERRL, conjunctiva/corneas clear, EOM's intact, both eyes   Ears:  Normal TM's and external ear canals, both ears   Nose: Nares normal, septum midline, mucosa normal, no drainage or sinus tenderness   Throat: Lips, mucosa, and tongue normal; teeth and gums normal   Neck: Supple, symmetrical, trachea midline, no adenopathy, thyroid: not enlarged, symmetric, no tenderness/mass/nodules, no carotid bruit or JVD   Back:   Symmetric, no curvature, ROM normal, no CVA tenderness   Lungs:   Clear to auscultation bilaterally, respirations unlabored   Chest Wall:  No tenderness or deformity   Heart:  Regular rate and rhythm, S1, S2 normal, no murmur, rub or gallop   Abdomen:   Soft, non-tender, bowel sounds active all four quadrants,  no masses, no organomegaly   Extremities: Extremities normal, atraumatic, no  cyanosis or edema left wrist without any swelling mild tenderness to the medial portion of the left wrist with questionable positive Finkelstein's testing.  No bony tenderness.  Good  radial pulses full and equal.   Pulses: 2+ and symmetric   Skin: Skin color, texture, turgor normal, no rashes or lesions   Lymph nodes: Cervical, supraclavicular, and axillary nodes normal   Neurologic: Normal     /80   Pulse 92   Resp 17   Ht 6' 1\" (1.854 m)   Wt 210 lb (95.3 kg)   SpO2 96%   BMI 27.71 kg/m²  Estimated body mass index is 27.71 kg/m² as calculated from the following:    Height as of this encounter: 6' 1\" (1.854 m).    Weight as of this encounter: 210 lb (95.3 kg).    Medicare Hearing Assessment:   Hearing Screening    Time taken: 9/5/2024  4:41 PM  Screening Method: Whisper Test  Whisper Test Result: Pass               Assessment & Plan:   Keith Winslow is a 64 year old male who presents for a Medicare Assessment.     1. Encounter for annual physical exam (Primary)  -     CBC With Differential With Platelet; Future; Expected date: 09/05/2024  -     Comp Metabolic Panel (14); Future; Expected date: 09/05/2024  -     Lipid Panel; Future; Expected date: 09/05/2024  -     TSH W Reflex To Free T4; Future; Expected date: 09/05/2024  -     PSA Total, Screen; Future; Expected date: 09/05/2024  2. Encounter for annual health examination  3. Essential hypertension  -     Losartan Potassium; Take 1 tablet (100 mg total) by mouth daily.  Dispense: 90 tablet; Refill: 1  -     hydroCHLOROthiazide; Take 1 tablet (25 mg total) by mouth daily.  Dispense: 90 tablet; Refill: 1  Blood pressure stable normotensive okay to continue with current medications losartan hydrochlorothiazide.    4. Left wrist pain  -     ORTHOPEDIC - INTERNAL  Most likely de Quervain's tenosynovitis  Advised to use a over-the-counter thumb spica.  Rest area avoid strong  or pinches  May take Tylenol Motrin for pain.  Follow-up with  orthopedics if not improving    5. Simple chronic bronchitis (HCC)  -     Qvar RediHaler; Inhale 1 puff into the lungs in the morning and 1 puff before bedtime.  Dispense: 10.6 g; Refill: 2  Will start patient on an inhaled steroid.  Cough most likely secondary to chronic bronchitis.  Rule out ACE induced cough.  Will monitor patient to follow-up in 3 months for recheck.        PATIENT INSTRUCTIONS:    Start QVAR twice a day  Follow up in 3 months  Recommend SHINGRIX vaccine for shingles, need second last dose, check with local pharmacy  Continue with all BP medications  Continue follow up with Urology  Use an over the counter thumb spica  Follow up with sports orthopedics if not improving, Dr Stern  Have blood tests doen  Follow up in 6 months for BLood pressure    The patient indicates understanding of these issues and agrees to the plan.        No follow-ups on file.     Mike Hackett MD, 9/5/2024     Supplementary Documentation:   General Health:  In the past six months, have you lost more than 10 pounds without trying?: 2 - No  Has your appetite been poor?: No  Type of Diet: Balanced  How does the patient maintain a good energy level?: Stretching  How would you describe your daily physical activity?: Moderate  How would you describe your current health state?: Good  How do you maintain positive mental well-being?: Social Interaction;Visiting Friends;Visiting Family  On a scale of 0 to 10, with 0 being no pain and 10 being severe pain, what is your pain level?: 5 - (Moderate)  In the past six months, have you experienced urine leakage?: 1-Yes  At any time do you feel concerned for the safety/well-being of yourself and/or your children, in your home or elsewhere?: No  Have you had any immunizations at another office such as Influenza, Hepatitis B, Tetanus, or Pneumococcal?: No    Health Maintenance   Topic Date Due    Zoster Vaccines (2 of 2) 11/15/2022    MA Annual Health Assessment  01/01/2024    Annual  Depression Screening  01/01/2024    Fall Risk Screening (Annual)  01/01/2024    COVID-19 Vaccine (4 - 2023-24 season) 09/01/2024    Influenza Vaccine (1) 10/01/2024    PSA  10/27/2025    Colorectal Cancer Screening  12/07/2025    Pneumococcal Vaccine: 65+ Years  Completed

## 2024-09-05 NOTE — PATIENT INSTRUCTIONS
Thank you for choosing [facility]  To Do:  FOR PUJA BRADLEY    Start QVAR twice a day  Follow up in 3 months  Recommend SHINGRIX vaccine for shingles, need second last dose, check with local pharmacy  Continue with all BP medications  Continue follow up with Urology  Use an over the counter thumb spica  Follow up with sports orthopedics if not improving, Dr Stern  Have blood tests doen  Follow up in 6 months for BLood pressure              Well balanced diet recommended.    Routine exercise recommended most days during the week.  Wear sunscreen - SPF 15 or higher and reapply every 2 hours as needed.  Wear seat belts and drive safely.  Schedule regular appointments with dentist.  Schedule yearly eye exam if you wear glasses/contacts.  Yearly Flu Vaccine recommended.  Tetanus, Diptheria and Pertussis vaccine should be given every 7-10 years.  Call or come in if there are concerns regarding domestic abuse, sexually transmitted diseases, alcohol/drug addiction, depression/anxiety issues, or any further concerns.    Prevention Guidelines, Men Ages 65 and Older  Screening tests and vaccines are an important part of managing your health. Health counseling is essential, too. Below are guidelines for these, for men ages 65 and older. Talk with your healthcare provider to make sure you’re up-to-date on what you need.  Screening Who needs it How often   Abdominal aortic aneurysm Men ages 65 to 75 who have ever smoked 1 ultrasound   Alcohol misuse All men in this age group At routine exams   Blood pressure All men in this age group Every 2 years if your blood pressure is less than 120/80 mm Hg; yearly if your systolic blood pressure is 120 to 139 mm Hg, or your diastolic blood pressure reading is 80 to 89 mm Hg   Colorectal cancer All men in this age group Flexible sigmoidoscopy every 5 years, or colonoscopy every 10 years, or double-contrast barium enema every 5 years; yearly fecal occult blood test or fecal  immunochemical test; or a stool DNA test as often as your healthcare provider advises; talk with your healthcare provider about which tests are best for you and when you no longer need colonoscopies (generally after age 75)   Depression All men in this age group At routine exams   Type 2 diabetes or prediabetes All adults beginning at age 45 and adults without symptoms at any age who are overweight or obese and have 1 or more other risk factors for diabetes At least every 3 years (yearly if your blood sugar has already begun to rise)   Hepatitis C Men at increased risk for infection - talk with your healthcare provider At routine exams   High cholesterol or triglycerides All men in this age group At least every 5 years   HIV Men at increased risk for infection - talk with your healthcare provider At routine exams   Lung cancer Adults ages 55 to 80 who have smoked Yearly screening in smokers with 30 pack-year history of smoking or who quit within 15 years   Obesity All men in this age group At routine exams   Prostate cancer All men in this age group, talk to healthcare provider about risks and benefits of digital rectal exam (LUIS E) and prostate-specific antigen (PSA) screening1 At routine exams   Syphilis Men at increased risk for infection - talk with your healthcare provider At routine exams   Tuberculosis Men at increased risk for infection - talk with your healthcare provider Ask your healthcare provider   Vision All men in this age group Every 1 to 2 years; if you have a chronic health condition, ask your healthcare provider if you needs exams more often   Vaccine Who needs it How often   Chickenpox (varicella) All men in this age group who have no record of this infection or vaccine 2 doses; second dose should be given at least 4 weeks after the first dose   Hepatitis A Men at increased risk for infection - talk with your healthcare provider 2 doses given at least 6 months apart   Hepatitis B Men at increased  risk for infection - talk with your healthcare provider 3 doses over 6 months; second dose should be given 1 month after the first dose; the third dose should be given at least 2 months after the second dose and at least 4 months after the first dose   Haemophilus influenzae Type B (HIB) Men at increased risk for infection - talk with your healthcare provider 1 to 3 doses   Influenza (flu) All men in this age group  Once a year   Meningococcal Men at increased risk for infection - talk with your healthcare provider 1 or more doses   Pneumococcal conjugate vaccine (PCV13) and pneumococcal polysaccharide vaccine (PPSV23) All men in this age group 1 dose of each vaccine   Tetanus/diphtheria/  pertussis (Td/Tdap) booster All men in this age group Td every 10 years, or Tdap if you will have contact with a child younger than 12 months old   Zoster All men in this age group 1 dose   Counseling Who needs it How often   Diet and exercise Men who are overweight or obese When diagnosed, and then at routine exams   Fall prevention (exercise, vitamin D supplements) All men in this age group At routine exams   Sexually transmitted infection Men at increased risk for infection - talk with your healthcare provider At routine exams   Use of daily aspirin Men ages 45 to 79 at risk for cardiovascular health problems At routine exams   Use of tobacco and the health effects it can cause All men in this age group Every visit   [address] [place] [address] [facility]   Date Last Reviewed: 2/1/2017  © 0847-4041 [employer]. [address] [place] [address]. All rights reserved. This information is not intended as a substitute for professional medical care. Always follow your healthcare professional's instructions.

## 2024-09-09 DIAGNOSIS — I10 ESSENTIAL HYPERTENSION: ICD-10-CM

## 2024-09-09 RX ORDER — LOSARTAN POTASSIUM 100 MG/1
100 TABLET ORAL DAILY
Qty: 90 TABLET | Refills: 1 | OUTPATIENT
Start: 2024-09-09

## 2024-09-09 RX ORDER — HYDROCHLOROTHIAZIDE 25 MG/1
25 TABLET ORAL DAILY
Qty: 90 TABLET | Refills: 1 | OUTPATIENT
Start: 2024-09-09

## 2024-09-13 ENCOUNTER — LAB ENCOUNTER (OUTPATIENT)
Dept: LAB | Age: 64
End: 2024-09-13
Attending: EMERGENCY MEDICINE
Payer: MEDICARE

## 2024-09-13 ENCOUNTER — TELEPHONE (OUTPATIENT)
Dept: CARDIOLOGY | Age: 64
End: 2024-09-13

## 2024-09-13 DIAGNOSIS — N28.9 RENAL INSUFFICIENCY: ICD-10-CM

## 2024-09-13 DIAGNOSIS — Z00.00 ENCOUNTER FOR ANNUAL PHYSICAL EXAM: ICD-10-CM

## 2024-09-13 LAB
ALBUMIN SERPL-MCNC: 4.1 G/DL (ref 3.2–4.8)
ALBUMIN/GLOB SERPL: 1.4 {RATIO} (ref 1–2)
ALP LIVER SERPL-CCNC: 68 U/L
ALT SERPL-CCNC: 20 U/L
ANION GAP SERPL CALC-SCNC: 8 MMOL/L (ref 0–18)
AST SERPL-CCNC: 28 U/L (ref ?–34)
BASOPHILS # BLD AUTO: 0.04 X10(3) UL (ref 0–0.2)
BASOPHILS NFR BLD AUTO: 0.6 %
BILIRUB SERPL-MCNC: 0.9 MG/DL (ref 0.2–1.1)
BUN BLD-MCNC: 9 MG/DL (ref 9–23)
CALCIUM BLD-MCNC: 10.2 MG/DL (ref 8.7–10.4)
CHLORIDE SERPL-SCNC: 106 MMOL/L (ref 98–112)
CHOLEST SERPL-MCNC: 146 MG/DL (ref ?–200)
CO2 SERPL-SCNC: 27 MMOL/L (ref 21–32)
COMPLEXED PSA SERPL-MCNC: 0.77 NG/ML (ref ?–4)
CREAT BLD-MCNC: 0.98 MG/DL
EGFRCR SERPLBLD CKD-EPI 2021: 85 ML/MIN/1.73M2 (ref 60–?)
EOSINOPHIL # BLD AUTO: 0.12 X10(3) UL (ref 0–0.7)
EOSINOPHIL NFR BLD AUTO: 1.9 %
ERYTHROCYTE [DISTWIDTH] IN BLOOD BY AUTOMATED COUNT: 13.1 %
FASTING PATIENT LIPID ANSWER: NO
FASTING STATUS PATIENT QL REPORTED: NO
GLOBULIN PLAS-MCNC: 2.9 G/DL (ref 2–3.5)
GLUCOSE BLD-MCNC: 104 MG/DL (ref 70–99)
HCT VFR BLD AUTO: 37.1 %
HDLC SERPL-MCNC: 48 MG/DL (ref 40–59)
HGB BLD-MCNC: 12.7 G/DL
IMM GRANULOCYTES # BLD AUTO: 0.02 X10(3) UL (ref 0–1)
IMM GRANULOCYTES NFR BLD: 0.3 %
LDLC SERPL CALC-MCNC: 81 MG/DL (ref ?–100)
LYMPHOCYTES # BLD AUTO: 1.53 X10(3) UL (ref 1–4)
LYMPHOCYTES NFR BLD AUTO: 24.2 %
MCH RBC QN AUTO: 30.3 PG (ref 26–34)
MCHC RBC AUTO-ENTMCNC: 34.2 G/DL (ref 31–37)
MCV RBC AUTO: 88.5 FL
MONOCYTES # BLD AUTO: 0.56 X10(3) UL (ref 0.1–1)
MONOCYTES NFR BLD AUTO: 8.9 %
NEUTROPHILS # BLD AUTO: 4.04 X10 (3) UL (ref 1.5–7.7)
NEUTROPHILS # BLD AUTO: 4.04 X10(3) UL (ref 1.5–7.7)
NEUTROPHILS NFR BLD AUTO: 64.1 %
NONHDLC SERPL-MCNC: 98 MG/DL (ref ?–130)
OSMOLALITY SERPL CALC.SUM OF ELEC: 291 MOSM/KG (ref 275–295)
PLATELET # BLD AUTO: 262 10(3)UL (ref 150–450)
POTASSIUM SERPL-SCNC: 3.8 MMOL/L (ref 3.5–5.1)
PROT SERPL-MCNC: 7 G/DL (ref 5.7–8.2)
RBC # BLD AUTO: 4.19 X10(6)UL
SODIUM SERPL-SCNC: 141 MMOL/L (ref 136–145)
TRIGL SERPL-MCNC: 88 MG/DL (ref 30–149)
TSI SER-ACNC: 1.46 MIU/ML (ref 0.55–4.78)
VLDLC SERPL CALC-MCNC: 14 MG/DL (ref 0–30)
WBC # BLD AUTO: 6.3 X10(3) UL (ref 4–11)

## 2024-09-13 PROCEDURE — 36415 COLL VENOUS BLD VENIPUNCTURE: CPT

## 2024-09-13 PROCEDURE — 84443 ASSAY THYROID STIM HORMONE: CPT

## 2024-09-13 PROCEDURE — 80053 COMPREHEN METABOLIC PANEL: CPT

## 2024-09-13 PROCEDURE — 85025 COMPLETE CBC W/AUTO DIFF WBC: CPT

## 2024-09-13 PROCEDURE — 80061 LIPID PANEL: CPT

## 2024-09-13 RX ORDER — ATORVASTATIN CALCIUM 10 MG/1
10 TABLET, FILM COATED ORAL DAILY
Qty: 90 TABLET | Refills: 1 | Status: SHIPPED | OUTPATIENT
Start: 2024-09-13

## 2024-09-21 ENCOUNTER — TELEPHONE (OUTPATIENT)
Dept: FAMILY MEDICINE CLINIC | Facility: CLINIC | Age: 64
End: 2024-09-21

## 2024-09-21 NOTE — TELEPHONE ENCOUNTER
Received via fax:    Beclomethasone Diprop HFA (QVAR REDIHALER) 40 MCG/ACT Inhalation Aerosol, Breath Activated     This medication is on backorder, requesting a new rx.

## 2024-09-23 RX ORDER — FLUTICASONE PROPIONATE 110 UG/1
1 AEROSOL, METERED RESPIRATORY (INHALATION) 2 TIMES DAILY
Qty: 12 G | Refills: 2 | Status: SHIPPED | OUTPATIENT
Start: 2024-09-23

## 2024-10-03 DIAGNOSIS — D64.9 ANEMIA, UNSPECIFIED TYPE: Primary | ICD-10-CM

## 2024-10-07 ENCOUNTER — APPOINTMENT (OUTPATIENT)
Dept: CT IMAGING | Age: 64
End: 2024-10-07
Attending: EMERGENCY MEDICINE
Payer: MEDICARE

## 2024-10-07 ENCOUNTER — HOSPITAL ENCOUNTER (EMERGENCY)
Age: 64
Discharge: HOME | End: 2024-10-07
Attending: EMERGENCY MEDICINE
Payer: MEDICARE

## 2024-10-07 VITALS
TEMPERATURE: 97 F | DIASTOLIC BLOOD PRESSURE: 74 MMHG | HEIGHT: 73 IN | RESPIRATION RATE: 16 BRPM | WEIGHT: 190 LBS | OXYGEN SATURATION: 98 % | BODY MASS INDEX: 25.18 KG/M2 | SYSTOLIC BLOOD PRESSURE: 130 MMHG | HEART RATE: 84 BPM

## 2024-10-07 DIAGNOSIS — K11.20 SIALOADENITIS OF SUBMANDIBULAR GLAND: Primary | ICD-10-CM

## 2024-10-07 LAB
ALBUMIN SERPL-MCNC: 3.5 G/DL (ref 3.4–5)
ALBUMIN/GLOB SERPL: 1.1 {RATIO} (ref 1–2)
ALP LIVER SERPL-CCNC: 112 U/L
ALT SERPL-CCNC: 24 U/L
ANION GAP SERPL CALC-SCNC: 3 MMOL/L (ref 0–18)
AST SERPL-CCNC: 25 U/L (ref 15–37)
BASOPHILS # BLD AUTO: 0.04 X10(3) UL (ref 0–0.2)
BASOPHILS NFR BLD AUTO: 0.3 %
BILIRUB SERPL-MCNC: 1.6 MG/DL (ref 0.1–2)
BUN BLD-MCNC: 13 MG/DL (ref 9–23)
CALCIUM BLD-MCNC: 8.9 MG/DL (ref 8.5–10.1)
CHLORIDE SERPL-SCNC: 106 MMOL/L (ref 98–112)
CO2 SERPL-SCNC: 28 MMOL/L (ref 21–32)
CREAT BLD-MCNC: 1.05 MG/DL
EGFRCR SERPLBLD CKD-EPI 2021: 78 ML/MIN/1.73M2 (ref 60–?)
EOSINOPHIL # BLD AUTO: 0.18 X10(3) UL (ref 0–0.7)
EOSINOPHIL NFR BLD AUTO: 1.5 %
ERYTHROCYTE [DISTWIDTH] IN BLOOD BY AUTOMATED COUNT: 13.2 %
GLOBULIN PLAS-MCNC: 3.2 G/DL (ref 2.8–4.4)
GLUCOSE BLD-MCNC: 100 MG/DL (ref 70–99)
HCT VFR BLD AUTO: 37.4 %
HGB BLD-MCNC: 12.9 G/DL
IMM GRANULOCYTES # BLD AUTO: 0.03 X10(3) UL (ref 0–1)
IMM GRANULOCYTES NFR BLD: 0.2 %
LYMPHOCYTES # BLD AUTO: 1.84 X10(3) UL (ref 1–4)
LYMPHOCYTES NFR BLD AUTO: 15.2 %
MCH RBC QN AUTO: 30.4 PG (ref 26–34)
MCHC RBC AUTO-ENTMCNC: 34.5 G/DL (ref 31–37)
MCV RBC AUTO: 88.2 FL
MONOCYTES # BLD AUTO: 1 X10(3) UL (ref 0.1–1)
MONOCYTES NFR BLD AUTO: 8.3 %
NEUTROPHILS # BLD AUTO: 9.03 X10 (3) UL (ref 1.5–7.7)
NEUTROPHILS # BLD AUTO: 9.03 X10(3) UL (ref 1.5–7.7)
NEUTROPHILS NFR BLD AUTO: 74.5 %
OSMOLALITY SERPL CALC.SUM OF ELEC: 284 MOSM/KG (ref 275–295)
PLATELET # BLD AUTO: 253 10(3)UL (ref 150–450)
POTASSIUM SERPL-SCNC: 3.5 MMOL/L (ref 3.5–5.1)
PROT SERPL-MCNC: 6.7 G/DL (ref 6.4–8.2)
RBC # BLD AUTO: 4.24 X10(6)UL
SODIUM SERPL-SCNC: 137 MMOL/L (ref 136–145)
WBC # BLD AUTO: 12.1 X10(3) UL (ref 4–11)

## 2024-10-07 PROCEDURE — 96374 THER/PROPH/DIAG INJ IV PUSH: CPT

## 2024-10-07 PROCEDURE — 99285 EMERGENCY DEPT VISIT HI MDM: CPT

## 2024-10-07 PROCEDURE — 85025 COMPLETE CBC W/AUTO DIFF WBC: CPT | Performed by: EMERGENCY MEDICINE

## 2024-10-07 PROCEDURE — 96372 THER/PROPH/DIAG INJ SC/IM: CPT

## 2024-10-07 PROCEDURE — 96375 TX/PRO/DX INJ NEW DRUG ADDON: CPT

## 2024-10-07 PROCEDURE — 80053 COMPREHEN METABOLIC PANEL: CPT | Performed by: EMERGENCY MEDICINE

## 2024-10-07 PROCEDURE — 70490 CT SOFT TISSUE NECK W/O DYE: CPT | Performed by: EMERGENCY MEDICINE

## 2024-10-07 RX ORDER — METHYLPREDNISOLONE SODIUM SUCCINATE 125 MG/2ML
125 INJECTION, POWDER, LYOPHILIZED, FOR SOLUTION INTRAMUSCULAR; INTRAVENOUS ONCE
Status: COMPLETED | OUTPATIENT
Start: 2024-10-07 | End: 2024-10-07

## 2024-10-07 RX ORDER — DIPHENHYDRAMINE HYDROCHLORIDE 50 MG/ML
50 INJECTION INTRAMUSCULAR; INTRAVENOUS ONCE
Status: COMPLETED | OUTPATIENT
Start: 2024-10-07 | End: 2024-10-07

## 2024-10-07 NOTE — ED PROVIDER NOTES
Patient Seen in: [facility] [place]      History     Chief Complaint   Patient presents with    Swallowing Problem    Swelling Edema     Stated Complaint: Pt with dysphagia and swelling to the left side of his neck. Sxs have progressi*    Subjective:   HPI    Patient is a 64-year-old male who states over the past 2 years she has had intermittent episodes of swelling to the left side of his neck.  Patient states he has mentioned it to his doctor but no etiology has been identified.  Patient states will sometimes last day or 2 and go away.  Patient states he started with swelling left side of his neck Saturday.  Patient states it progressively got worse overnight and had trouble swallowing.  He denies trouble breathing.  No lip swelling or tongue swelling.  Patient states it is actually doing better than it was.  Patient denies fevers or chills, no sore throat, no chest pain, shortness of breath, no abdominal pain.  Remainder of review of systems negative.    Objective:     Past Medical History:    Sleep apnea    Unspecified essential hypertension              Past Surgical History:   Procedure Laterality Date    Eye surgery      Fluor gid & loclzj ndl/cath spi dx/ther njx  6/26/2014    Procedure: ;  Surgeon: Aleksandr Rendon MD;  Location: [facility]    Fluor gid & loclzj ndl/cath spi dx/ther njx N/A 7/17/2014    Procedure: CERVICAL EPIDURAL;  Surgeon: Aleksandr Rendon MD;  Location: [facility]    Fluor gid & loclzj ndl/cath spi dx/ther njx N/A 8/7/2014    Procedure: CERVICAL EPIDURAL;  Surgeon: Aleksandr Rendon MD;  Location: [facility]    Injection, w/wo contrast, dx/therapeutic substance, epidural/subarachnoid; cervical/thoracic N/A 6/26/2014    Procedure: CERVICAL EPIDURAL;  Surgeon: Aleksandr Rendon MD;  Location: [facility]    Injection, w/wo contrast, dx/therapeutic substance, epidural/subarachnoid;  cervical/thoracic N/A 7/17/2014    Procedure: CERVICAL EPIDURAL;  Surgeon: Aleksandr Rendon MD;  Location: [facility]    Injection, w/wo contrast, dx/therapeutic substance, epidural/subarachnoid; cervical/thoracic N/A 8/7/2014    Procedure: CERVICAL EPIDURAL;  Surgeon: Aleksandr Rendon MD;  Location: [facility]    Repair rotator cuff,acute                  Social History     Socioeconomic History    Marital status:    Tobacco Use    Smoking status: Former     Passive exposure: Never    Smokeless tobacco: Never   Vaping Use    Vaping status: Never Used   Substance and Sexual Activity    Alcohol use: Yes     Comment: occ    Drug use: No     Social Determinants of Health     Physical Activity: Medium Risk (2/28/2024)    [facility]    Exercise Vital Sign     On average, how many days per week do you engage in moderate to strenuous exercise (like a brisk walk)?: 2 days     On average, how many minutes do you engage in exercise at this level?: 60 min                  Physical Exam     ED Triage Vitals [10/07/24 0550]   /78   Pulse 92   Resp 16   Temp 97.4 °F (36.3 °C)   Temp src Temporal   SpO2 97 %   O2 Device None (Room air)       Current Vitals:   Vital Signs  BP: 130/74  Pulse: 84  Resp: 16  Temp: 97.4 °F (36.3 °C)  Temp src: Temporal    Oxygen Therapy  SpO2: 98 %  O2 Device: None (Room air)        Physical Exam   GENERAL: Patient resting on the cart in no acute distress.  HEENT: Extraocular muscles intact, pupils equal round reactive to light and accommodation.  Mouth normal, neck supple, no meningismus.  Boggy swelling left neck roughly 6 x 6 inch area.  No erythema or warmth.  LUNGS: Lungs clear to auscultation bilaterally.  CARDIOVASCULAR: + S1-S2, regular rate and rhythm, no murmurs.  BACK: No CVA tenderness, no midline bony tenderness.  ABDOMEN: + Bowel sounds, soft, nontender, nondistended.  No rebound, no  guarding, no hepatosplenomegaly.  EXTREMITIES: Full range of motion, no tenderness, good capillary refill.  SKIN: No rash, good turgor.  NEURO: Patient answers questions appropriately.  No focal deficits appreciated.        ED Course     Labs Reviewed   COMP METABOLIC PANEL (14) - Abnormal; Notable for the following components:       Result Value    Glucose 100 (*)     All other components within normal limits   CBC WITH DIFFERENTIAL WITH PLATELET - Abnormal; Notable for the following components:    WBC 12.1 (*)     HGB 12.9 (*)     HCT 37.4 (*)     Neutrophil Absolute Prelim 9.03 (*)     Neutrophil Absolute 9.03 (*)     All other components within normal limits            CT soft tissueEnlargement of the left submandibular gland with surrounding inflammatory stranding and adjacent subcutaneous edema and thickening of the fascial plane.  There is some adjacent prominent lymph nodes.  Findings consistent with left-sided   submandibular sialoadenitis.      There is fluid in the belly reviewed by myself, no airway occlusion           MDM      Patient given IV placed on monitor.  Patient was given Benadryl, Solu-Medrol, epi.  Patient symptoms concerning for angioedema.  Patient is currently only taking losartan, hydrochlorothiazide and statin.  Patient given dose of IV Unasyn.  Patient had elevated white count.  Patient did have a CT soft tissue neck concerning for sialoadenitis of submandibular gland.  I did speak with ENT and patient to follow-up in the office this week call for an appointment.  Patient comfortable going home.  May continue Zyrtec as discussed.  Augmentin as prescribed.   ENT recommended holding on steroids until reevaluation.  I did consider angioedema, sialoadenitis, neck mass.  Return if trouble breathing, trouble swallowing, fever, new or worse symptoms.  Rest, plenty fluids.  Sialagogues, ibuprofen.  Augmentin as prescribed                        Medical Decision Making      Disposition and Plan      Clinical Impression:  1. Sialoadenitis of submandibular gland         Disposition:  Discharge  10/7/2024  8:39 am    Follow-up:  Mike Hackett MD  [address]  [facility] [address]  [phone]    Follow up in 2 day(s)      Yan Sprague MD  [address]  [facility] [address]  [facility] [address]  [phone]    Follow up in 2 day(s)            Medications Prescribed:  Discharge Medication List as of 10/7/2024  8:40 AM              Supplementary Documentation:

## 2024-10-07 NOTE — DISCHARGE INSTRUCTIONS
Follow-up for further evaluation with ENT and primary physician.  Call for an appointment.  Return if trouble breathing, trouble swallowing, new or worse symptoms.  Plenty of fluids.  May use ibuprofen as needed.  Augmentin as prescribed.  Lemon candies as discussed

## 2024-10-08 ENCOUNTER — PATIENT OUTREACH (OUTPATIENT)
Dept: CASE MANAGEMENT | Age: 64
End: 2024-10-08

## 2024-10-08 NOTE — PROGRESS NOTES
Patient had recent Emergency Room visit, calling to offer Primary Care Physician follow-up appointment (discharged 10/07)    Dr Mike Hackett  [facility], Emergency Medicine  [address] [place] [address]  [phone]    Attempt #1:  Left message on voicemail for patient to call transitions specialist back to schedule follow up appointments. Provided Transitions specialist scheduling phone number [phone].

## 2024-10-09 NOTE — PROGRESS NOTES
Voicemail received; Patient returning call  Called patient back    Patient had recent Emergency Room visit, calling to offer Primary Care Physician follow-up appointment (discharged 10/07)     Dr Mike Hackett  Family Medicine, Emergency Medicine  [address] [place] [address] [phone]  LVM if still need assistance to call [phone]  Closing encounter

## 2024-10-09 NOTE — PROGRESS NOTES
Patient had recent Emergency Room visit, calling to offer Primary Care Physician follow-up appointment (discharged 10/07)     Dr Mike Hackett  [facility], Emergency Medicine  [address] [place] [id number]  [phone]  Multiple attempts w/no calls back; no appointment made  Closing encounter    Attempt #2:  Left message on voicemail for patient to call transitions specialist back to schedule follow up appointments. Provided Transitions specialist scheduling phone number [phone]. Closing encounter. Will re-open if patient returns call.

## 2024-10-14 ENCOUNTER — OFFICE VISIT (OUTPATIENT)
Facility: LOCATION | Age: 64
End: 2024-10-14
Payer: COMMERCIAL

## 2024-10-14 DIAGNOSIS — T78.3XXD ANGIOEDEMA, SUBSEQUENT ENCOUNTER: Primary | ICD-10-CM

## 2024-10-14 PROCEDURE — 99204 OFFICE O/P NEW MOD 45 MIN: CPT | Performed by: STUDENT IN AN ORGANIZED HEALTH CARE EDUCATION/TRAINING PROGRAM

## 2024-10-14 PROCEDURE — 1159F MED LIST DOCD IN RCRD: CPT | Performed by: STUDENT IN AN ORGANIZED HEALTH CARE EDUCATION/TRAINING PROGRAM

## 2024-10-14 PROCEDURE — 1160F RVW MEDS BY RX/DR IN RCRD: CPT | Performed by: STUDENT IN AN ORGANIZED HEALTH CARE EDUCATION/TRAINING PROGRAM

## 2024-10-14 NOTE — H&P
Keith Winslow is a 64 year old male. No chief complaint on file.    HPI:   64 y M with PMHx of sialadenitis recently seen in ED with neck swelling presenting for follow up. He has a history of recurrent neck swelling and was evaluated by ENT in 2022 and diagnosed with sialadenitis. He was seen in the ED on 10/7 with dysphagia and left neck swelling. CT neck revealed inflammatory stranding surrounding the submandibular gland c/w sialadenitis, however, the ED physician was also concerned for angioedema. Patient notes he had difficulty breathing at that time. Patient showed a photo of his neck from 10/7 and he had significant left neck swelling. He was given Unasyn, benadryl, solumedrol and epinephrine in the ED after which his symptoms resolved. He was prescribed augmentin and has been compliant Of note, patient has been on losartan for years. He reports prior episodes in the past have improved with benadryl at home but went to ED due to severity. Prior US and CT neck have not revealed evidence of a salivary stone.     His symptoms have improved and he denies fever, chills, loss of appetite, dyspnea, dysphagia, and odynophagia.    Current Outpatient Medications   Medication Sig Dispense Refill    amoxicillin clavulanate 875-125 MG Oral Tab Take 1 tablet by mouth 2 (two) times daily for 10 days. 20 tablet 0    fluticasone propionate 110 MCG/ACT Inhalation Aerosol Inhale 1 puff into the lungs 2 (two) times daily. 12 g 2    losartan 100 MG Oral Tab Take 1 tablet (100 mg total) by mouth daily. 90 tablet 1    hydroCHLOROthiazide 25 MG Oral Tab Take 1 tablet (25 mg total) by mouth daily. 90 tablet 1    Beclomethasone Diprop HFA (QVAR REDIHALER) 40 MCG/ACT Inhalation Aerosol, Breath Activated Inhale 1 puff into the lungs in the morning and 1 puff before bedtime. 10.6 g 2    TRIAMCINOLONE 0.5 % External Cream APPLY TOPICALLY TO THE AFFECTED AREA TWICE DAILY. DO NOT USE FOR. NO MORE THAN 14 DAYS CONTINUOUSLY 30 g 2     metoprolol tartrate 50 MG Oral Tab Take 1 tablet (50 mg total) by mouth. (Patient not taking: Reported on 4/4/2024)      clobetasol 0.05 % External Ointment Apply 1 Application topically 2 (two) times daily as needed. APPLY TO AFFECTED AREA      atorvastatin 10 MG Oral Tab Take 1 tablet (10 mg total) by mouth.      Multiple Vitamin (MULTI-VITAMIN DAILY) Oral Tab Take by mouth.        Past Medical History:    Sleep apnea    Unspecified essential hypertension      Social History:  Social History     Socioeconomic History    Marital status:    Tobacco Use    Smoking status: Former     Passive exposure: Never    Smokeless tobacco: Never   Vaping Use    Vaping status: Never Used   Substance and Sexual Activity    Alcohol use: Yes     Comment: occ    Drug use: No     Social Drivers of Health     Physical Activity: Medium Risk (2/28/2024)    Received from [facility] [employer] [employer], [facility]    Exercise Vital Sign     On average, how many days per week do you engage in moderate to strenuous exercise (like a brisk walk)?: 2 days     On average, how many minutes do you engage in exercise at this level?: 60 min      Past Surgical History:   Procedure Laterality Date    Eye surgery      Fluor gid & loclzj ndl/cath spi dx/ther njx  6/26/2014    Procedure: ;  Surgeon: Aleksandr Rendon MD;  Location: [facility]    Fluor gid & loclzj ndl/cath spi dx/ther njx N/A 7/17/2014    Procedure: CERVICAL EPIDURAL;  Surgeon: Aleksandr Rendon MD;  Location: [facility]    Fluor gid & loclzj ndl/cath spi dx/ther njx N/A 8/7/2014    Procedure: CERVICAL EPIDURAL;  Surgeon: Aleksandr Rendon MD;  Location: [facility]    Injection, w/wo contrast, dx/therapeutic substance, epidural/subarachnoid; cervical/thoracic N/A 6/26/2014    Procedure: CERVICAL EPIDURAL;  Surgeon: Aleksandr Rendon MD;  Location: [facility]    Injection, w/wo contrast, dx/therapeutic  substance, epidural/subarachnoid; cervical/thoracic N/A 7/17/2014    Procedure: CERVICAL EPIDURAL;  Surgeon: Aleksandr Rendon MD;  Location: [facility]    Injection, w/wo contrast, dx/therapeutic substance, epidural/subarachnoid; cervical/thoracic N/A 8/7/2014    Procedure: CERVICAL EPIDURAL;  Surgeon: Aleksandr Rendon MD;  Location: [facility]    Repair rotator cuff,acute           REVIEW OF SYSTEMS:   GENERAL HEALTH: feels well otherwise  GENERAL : denies fever, chills, sweats, weight loss, weight gain  SKIN: denies any unusual skin lesions or rashes  RESPIRATORY: denies shortness of breath with exertion  NEURO: denies headaches    EXAM:   There were no vitals taken for this visit.    System Findings Details   Constitutional  Overall appearance - Normal.   Head/Face  Facial features -- Normal. Skull - Normal.   Eyes  Sclera white, pupils equal and round   Ears  External ears normal in appearance, EAC patent, TM intact, no evidence of middle ear effusion   Nose  External nose normal in appearance, nares patent, septum straight, no epistaxis   Throat  Posterior pharynx clear, uvula midline   Oral cavity  Lips normal in appearance, mucous membranes clear, no masses, FOM soft, tongue without lesions   Neck  Trachea midline, no lymphadenopathy, no masses   Neurological  Memory - Normal. Cranial nerves - Cranial nerves II through XII grossly intact.     CT neck w/o contrast 10/7/2024  Images personally reviewed  CONCLUSION:  Enlargement of the left submandibular gland and adjacent subcutaneous edema, airway shifted to right    ASSESSMENT AND PLAN:   64 y M presents for follow up after ED visit with neck swelling and concerns for angioedema.     -Given overall clinical picture with severe neck swelling causing dyspnea and airway shift on CT, improvement of multiple prior episodes with benadryl and history of losartan use, suspect ARB related angioedema  -Advised patient to stop  losartan. He has a PCP visit scheduled in 4 days  -Follow up in 6 months, will consider allergy referral     The patient indicates understanding of these issues and agrees to the plan.      Rita Gómez MD  10/13/2024  8:11 PM

## 2024-10-21 ENCOUNTER — OFFICE VISIT (OUTPATIENT)
Dept: FAMILY MEDICINE CLINIC | Facility: CLINIC | Age: 64
End: 2024-10-21
Payer: COMMERCIAL

## 2024-10-21 VITALS
WEIGHT: 207 LBS | HEART RATE: 95 BPM | SYSTOLIC BLOOD PRESSURE: 112 MMHG | OXYGEN SATURATION: 98 % | RESPIRATION RATE: 20 BRPM | BODY MASS INDEX: 27.43 KG/M2 | HEIGHT: 73 IN | DIASTOLIC BLOOD PRESSURE: 70 MMHG

## 2024-10-21 DIAGNOSIS — J41.0 SIMPLE CHRONIC BRONCHITIS (HCC): ICD-10-CM

## 2024-10-21 DIAGNOSIS — I10 ESSENTIAL HYPERTENSION: Primary | ICD-10-CM

## 2024-10-21 PROCEDURE — 99214 OFFICE O/P EST MOD 30 MIN: CPT | Performed by: EMERGENCY MEDICINE

## 2024-10-21 PROCEDURE — 3074F SYST BP LT 130 MM HG: CPT | Performed by: EMERGENCY MEDICINE

## 2024-10-21 PROCEDURE — 1160F RVW MEDS BY RX/DR IN RCRD: CPT | Performed by: EMERGENCY MEDICINE

## 2024-10-21 PROCEDURE — 1159F MED LIST DOCD IN RCRD: CPT | Performed by: EMERGENCY MEDICINE

## 2024-10-21 PROCEDURE — 3078F DIAST BP <80 MM HG: CPT | Performed by: EMERGENCY MEDICINE

## 2024-10-21 PROCEDURE — 3008F BODY MASS INDEX DOCD: CPT | Performed by: EMERGENCY MEDICINE

## 2024-10-21 RX ORDER — BECLOMETHASONE DIPROPIONATE HFA 40 UG/1
1 AEROSOL, METERED RESPIRATORY (INHALATION) 2 TIMES DAILY
Qty: 10.6 G | Refills: 2 | Status: CANCELLED | OUTPATIENT
Start: 2024-10-21 | End: 2025-10-16

## 2024-10-21 RX ORDER — FLUTICASONE PROPIONATE 110 UG/1
1 AEROSOL, METERED RESPIRATORY (INHALATION) 2 TIMES DAILY
Qty: 3 EACH | Refills: 3 | Status: SHIPPED | OUTPATIENT
Start: 2024-10-21

## 2024-10-21 RX ORDER — LISINOPRIL 20 MG/1
20 TABLET ORAL DAILY
Qty: 90 TABLET | Refills: 1 | Status: SHIPPED | OUTPATIENT
Start: 2024-10-21

## 2024-10-21 NOTE — PROGRESS NOTES
Chief Complaint:   Chief Complaint   Patient presents with    ER F/U     HPI:   This is a 64 year old male         HOSPITAL FOLLOW UP  Pt presents for follow up from ER  Patient seen in the emergency room for left-sided neck swelling  CT scan of the neck were consistent with submandibular sialoadenitis.  Patient treated in the emergency room with antibiotics Augmentin.  Patient continued and followed up with ENT who was more concerned about angioedema and discontinued losartan.  Patient here for follow-up.  Feels much better has no symptoms swelling in left neck has subsided.    When history is reviewed patient reports a long history of on and off swelling on the left side of his neck.  Was evaluated regarding this multiple times in the past.  Ultrasound done in the past when it was not small and was also negative.  Patient is also seen ENT in the past and clinically and by history his symptoms were more consistent with sialodenitis      PMSH       Past Medical History:    Sleep apnea    Unspecified essential hypertension     Past Surgical History:   Procedure Laterality Date    Eye surgery      Fluor gid & loclzj ndl/cath spi dx/ther njx  6/26/2014    Procedure: ;  Surgeon: Aleksandr Rendon MD;  Location: [facility]    Fluor gid & loclzj ndl/cath spi dx/ther njx N/A 7/17/2014    Procedure: CERVICAL EPIDURAL;  Surgeon: Aleksandr Rendon MD;  Location: [facility]    Fluor gid & loclzj ndl/cath spi dx/ther njx N/A 8/7/2014    Procedure: CERVICAL EPIDURAL;  Surgeon: Aleksandr Rendon MD;  Location: [facility]    Injection, w/wo contrast, dx/therapeutic substance, epidural/subarachnoid; cervical/thoracic N/A 6/26/2014    Procedure: CERVICAL EPIDURAL;  Surgeon: Aleksandr Rendon MD;  Location: [facility]    Injection, w/wo contrast, dx/therapeutic substance, epidural/subarachnoid; cervical/thoracic N/A 7/17/2014    Procedure: CERVICAL EPIDURAL;   Surgeon: Aleksandr Rendon MD;  Location: [facility]    Injection, w/wo contrast, dx/therapeutic substance, epidural/subarachnoid; cervical/thoracic N/A 8/7/2014    Procedure: CERVICAL EPIDURAL;  Surgeon: Aleksandr Rendon MD;  Location: [facility]    Repair rotator cuff,acute       Social History:  Social History     Socioeconomic History    Marital status:    Tobacco Use    Smoking status: Former     Passive exposure: Never    Smokeless tobacco: Never   Vaping Use    Vaping status: Never Used   Substance and Sexual Activity    Alcohol use: Yes     Comment: occ    Drug use: No     Social Drivers of Health     Physical Activity: Medium Risk (2/28/2024)    Received from [employer] [employer]    Exercise Vital Sign     On average, how many days per week do you engage in moderate to strenuous exercise (like a brisk walk)?: 2 days     On average, how many minutes do you engage in exercise at this level?: 60 min     Family History:  Family History   Problem Relation Age of Onset    Hypertension Mother      Allergies:  Allergies[1]  Current Meds:  Medications Ordered Prior to Encounter[2]   Counseling given: Not Answered         PROBLEM LIST     Patient Active Problem List   Diagnosis    Upper extremity weakness    Cervical spondylosis    Spinal stenosis of cervical region    DDD (degenerative disc disease), cervical    Plantar fasciitis, bilateral    Cubital tunnel syndrome on left    Eczema    Essential hypertension    Tobacco abuse counseling    Tobacco abuse    Mixed hyperlipidemia    Rash and nonspecific skin eruption    Sialadenitis    Prostate hypertrophy             PHYSICAL EXAM:   /70   Pulse 95   Resp 20   Ht 6' 1\" (1.854 m)   Wt 207 lb (93.9 kg)   SpO2 98%   BMI 27.31 kg/m²  Estimated body mass index is 27.31 kg/m² as calculated from the following:    Height as of this encounter: 6' 1\" (1.854 m).    Weight as of this encounter: 207  lb (93.9 kg).   Vital signs reviewed.Appears stated age, well groomed.  GENERAL: well developed, well nourished, well hydrated, no distress  SKIN: good skin turgor, no obvious rashes  HEENT: atraumatic, normocephalic, ears, nose and throat are clear  EYES: sclera non icteric bilateral  NECK: supple, no adenopathy, no thyromegaly  LUNGS: clear to auscultation, no RRW  CARDIO: RRR without murmur  EXTREMITIES: no cyanosis, clubbing or edema  GI:soft Nontender Normoactive BS.  No palpable masses no guarding rigidity no rebound tenderness    Recent Results (from the past 4 weeks)   Comp Metabolic Panel (14)    Collection Time: 10/07/24  6:22 AM   Result Value Ref Range    Glucose 100 (H) 70 - 99 mg/dL    Sodium 137 136 - 145 mmol/L    Potassium 3.5 3.5 - 5.1 mmol/L    Chloride 106 98 - 112 mmol/L    CO2 28.0 21.0 - 32.0 mmol/L    Anion Gap 3 0 - 18 mmol/L    BUN 13 9 - 23 mg/dL    Creatinine 1.05 0.70 - 1.30 mg/dL    Calcium, Total 8.9 8.5 - 10.1 mg/dL    Calculated Osmolality 284 275 - 295 mOsm/kg    eGFR-Cr 78 >=60 mL/min/1.73m2    AST 25 15 - 37 U/L    ALT 24 16 - 61 U/L    Alkaline Phosphatase 112 45 - 117 U/L    Bilirubin, Total 1.6 0.1 - 2.0 mg/dL    Total Protein 6.7 6.4 - 8.2 g/dL    Albumin 3.5 3.4 - 5.0 g/dL    Globulin  3.2 2.8 - 4.4 g/dL    A/G Ratio 1.1 1.0 - 2.0   CBC With Differential With Platelet    Collection Time: 10/07/24  6:22 AM   Result Value Ref Range    WBC 12.1 (H) 4.0 - 11.0 x10(3) uL    RBC 4.24 3.80 - 5.80 x10(6)uL    HGB 12.9 (L) 13.0 - 17.5 g/dL    HCT 37.4 (L) 39.0 - 53.0 %    .0 150.0 - 450.0 10(3)uL    MCV 88.2 80.0 - 100.0 fL    MCH 30.4 26.0 - 34.0 pg    MCHC 34.5 31.0 - 37.0 g/dL    RDW 13.2 %    Neutrophil Absolute Prelim 9.03 (H) 1.50 - 7.70 x10 (3) uL    Neutrophil Absolute 9.03 (H) 1.50 - 7.70 x10(3) uL    Lymphocyte Absolute 1.84 1.00 - 4.00 x10(3) uL    Monocyte Absolute 1.00 0.10 - 1.00 x10(3) uL    Eosinophil Absolute 0.18 0.00 - 0.70 x10(3) uL    Basophil Absolute 0.04  0.00 - 0.20 x10(3) uL    Immature Granulocyte Absolute 0.03 0.00 - 1.00 x10(3) uL    Neutrophil % 74.5 %    Lymphocyte % 15.2 %    Monocyte % 8.3 %    Eosinophil % 1.5 %    Basophil % 0.3 %    Immature Granulocyte % 0.2 %           ASSESSMENT AND PLAN:         1. Essential hypertension  - lisinopril 20 MG Oral Tab; Take 1 tablet (20 mg total) by mouth daily.  Dispense: 90 tablet; Refill: 1    2. Simple chronic bronchitis (HCC)  - fluticasone propionate 110 MCG/ACT Inhalation Aerosol; Inhale 1 puff into the lungs 2 (two) times daily.  Dispense: 3 each; Refill: 3    All ER notes and workup reviewed.  CT scan findings are consistent with sialoadenitis.  Patient symptoms have resolved.  Symptoms are most likely secondary to the acute infection of the submandibular gland.  Doubt that his symptoms were secondary to and you edema.  Patient denies any history of lip or tongue swelling.  Okay to stop losartan but will start patient on lisinopril.  Monitor blood pressure.  Okay to follow-up in 6 months if BP remains stable.  Continue follow-up with ENT in 6 months as instructed    PATIENT INSTRUCTIONS:    Ok to start Lisinopril 20   Monitor blood pressure daily and if stable ok to check 1 x a week  MOnitor for any recurring Symptom  Continue with lemon candy  Continue follow up with EENT    FOLLOW UP: 6 months if BP stable               [1]   Allergies  Allergen Reactions    Losartan ANGIOEDEMA   [2]   Current Outpatient Medications on File Prior to Visit   Medication Sig Dispense Refill    hydroCHLOROthiazide 25 MG Oral Tab Take 1 tablet (25 mg total) by mouth daily. 90 tablet 1    TRIAMCINOLONE 0.5 % External Cream APPLY TOPICALLY TO THE AFFECTED AREA TWICE DAILY. DO NOT USE FOR. NO MORE THAN 14 DAYS CONTINUOUSLY 30 g 2    clobetasol 0.05 % External Ointment Apply 1 Application topically 2 (two) times daily as needed. APPLY TO AFFECTED AREA      atorvastatin 10 MG Oral Tab Take 1 tablet (10 mg total) by mouth.      Multiple  Vitamin (MULTI-VITAMIN DAILY) Oral Tab Take by mouth.      Beclomethasone Diprop HFA (QVAR REDIHALER) 40 MCG/ACT Inhalation Aerosol, Breath Activated Inhale 1 puff into the lungs in the morning and 1 puff before bedtime. (Patient not taking: Reported on 10/21/2024) 10.6 g 2     No current facility-administered medications on file prior to visit.

## 2024-10-21 NOTE — PATIENT INSTRUCTIONS
Thank you for choosing [facility]  To Do:  FOR PUJA BRADLEY    Ok to start Lisinopril 20   Monitor blood pressure daily and if stable ok to check 1 x a week  MOnitor for any recurring Symptom  Continue with lemon candy  Continue follow up with MARCOS

## 2024-11-01 ENCOUNTER — APPOINTMENT (OUTPATIENT)
Dept: CARDIOLOGY | Age: 64
End: 2024-11-01

## 2024-11-01 VITALS
HEIGHT: 73 IN | SYSTOLIC BLOOD PRESSURE: 119 MMHG | DIASTOLIC BLOOD PRESSURE: 73 MMHG | WEIGHT: 210 LBS | HEART RATE: 83 BPM | BODY MASS INDEX: 27.83 KG/M2

## 2024-11-01 DIAGNOSIS — R06.09 DOE (DYSPNEA ON EXERTION): Primary | ICD-10-CM

## 2024-11-01 DIAGNOSIS — I10 ESSENTIAL HYPERTENSION: ICD-10-CM

## 2024-11-01 DIAGNOSIS — Z72.0 TOBACCO ABUSE: ICD-10-CM

## 2024-11-01 DIAGNOSIS — E78.2 MIXED HYPERLIPIDEMIA: ICD-10-CM

## 2024-11-01 RX ORDER — AMLODIPINE BESYLATE 5 MG/1
5 TABLET ORAL DAILY
Qty: 90 TABLET | Refills: 3 | Status: SHIPPED | OUTPATIENT
Start: 2024-11-01

## 2024-11-01 RX ORDER — LISINOPRIL 20 MG/1
1 TABLET ORAL DAILY
COMMUNITY
Start: 2024-10-21 | End: 2024-11-01 | Stop reason: ALTCHOICE

## 2024-11-13 ENCOUNTER — RX ONLY (RX ONLY)
Age: 64
End: 2024-11-13

## 2024-11-13 ENCOUNTER — MED REC SCAN ONLY (OUTPATIENT)
Dept: FAMILY MEDICINE CLINIC | Facility: CLINIC | Age: 64
End: 2024-11-13

## 2024-11-13 RX ORDER — MINOCYCLINE HYDROCHLORIDE 100 MG/1
CAPSULE ORAL
Qty: 60 | Refills: 1 | Status: ERX

## 2025-01-10 ENCOUNTER — LAB ENCOUNTER (OUTPATIENT)
Dept: LAB | Age: 65
End: 2025-01-10
Attending: EMERGENCY MEDICINE
Payer: MEDICARE

## 2025-01-10 ENCOUNTER — OFFICE VISIT (OUTPATIENT)
Dept: FAMILY MEDICINE CLINIC | Facility: CLINIC | Age: 65
End: 2025-01-10
Payer: COMMERCIAL

## 2025-01-10 VITALS
WEIGHT: 208 LBS | RESPIRATION RATE: 16 BRPM | DIASTOLIC BLOOD PRESSURE: 78 MMHG | SYSTOLIC BLOOD PRESSURE: 138 MMHG | HEART RATE: 88 BPM | TEMPERATURE: 98 F | OXYGEN SATURATION: 98 % | BODY MASS INDEX: 27.57 KG/M2 | HEIGHT: 73 IN

## 2025-01-10 DIAGNOSIS — D64.9 ANEMIA, UNSPECIFIED TYPE: ICD-10-CM

## 2025-01-10 DIAGNOSIS — I10 ESSENTIAL HYPERTENSION: ICD-10-CM

## 2025-01-10 DIAGNOSIS — Z23 NEED FOR SHINGLES VACCINE: ICD-10-CM

## 2025-01-10 DIAGNOSIS — Z11.59 NEED FOR HEPATITIS C SCREENING TEST: ICD-10-CM

## 2025-01-10 DIAGNOSIS — Z29.11 NEED FOR RSV IMMUNIZATION: ICD-10-CM

## 2025-01-10 DIAGNOSIS — J41.0 SIMPLE CHRONIC BRONCHITIS (HCC): ICD-10-CM

## 2025-01-10 DIAGNOSIS — Z00.00 ENCOUNTER FOR ANNUAL HEALTH EXAMINATION: Primary | ICD-10-CM

## 2025-01-10 DIAGNOSIS — Z23 NEED FOR VACCINATION: ICD-10-CM

## 2025-01-10 DIAGNOSIS — E78.00 HYPERCHOLESTEROLEMIA: ICD-10-CM

## 2025-01-10 PROBLEM — R06.09 DOE (DYSPNEA ON EXERTION): Status: ACTIVE | Noted: 2024-02-28

## 2025-01-10 LAB
BASOPHILS # BLD AUTO: 0.06 X10(3) UL (ref 0–0.2)
BASOPHILS NFR BLD AUTO: 1 %
DEPRECATED HBV CORE AB SER IA-ACNC: 16 NG/ML
EOSINOPHIL # BLD AUTO: 0.18 X10(3) UL (ref 0–0.7)
EOSINOPHIL NFR BLD AUTO: 2.9 %
ERYTHROCYTE [DISTWIDTH] IN BLOOD BY AUTOMATED COUNT: 13.6 %
HCT VFR BLD AUTO: 41.2 %
HCV AB SERPL QL IA: NONREACTIVE
HGB BLD-MCNC: 13.6 G/DL
IMM GRANULOCYTES # BLD AUTO: 0.02 X10(3) UL (ref 0–1)
IMM GRANULOCYTES NFR BLD: 0.3 %
IRON SATN MFR SERPL: 16 %
IRON SERPL-MCNC: 68 UG/DL
LYMPHOCYTES # BLD AUTO: 1.49 X10(3) UL (ref 1–4)
LYMPHOCYTES NFR BLD AUTO: 24.2 %
MCH RBC QN AUTO: 29.4 PG (ref 26–34)
MCHC RBC AUTO-ENTMCNC: 33 G/DL (ref 31–37)
MCV RBC AUTO: 89.2 FL
MONOCYTES # BLD AUTO: 0.65 X10(3) UL (ref 0.1–1)
MONOCYTES NFR BLD AUTO: 10.6 %
NEUTROPHILS # BLD AUTO: 3.75 X10 (3) UL (ref 1.5–7.7)
NEUTROPHILS # BLD AUTO: 3.75 X10(3) UL (ref 1.5–7.7)
NEUTROPHILS NFR BLD AUTO: 61 %
PLATELET # BLD AUTO: 266 10(3)UL (ref 150–450)
RBC # BLD AUTO: 4.62 X10(6)UL
TOTAL IRON BINDING CAPACITY: 426 UG/DL (ref 250–425)
TRANSFERRIN SERPL-MCNC: 337 MG/DL (ref 215–365)
WBC # BLD AUTO: 6.2 X10(3) UL (ref 4–11)

## 2025-01-10 PROCEDURE — 82607 VITAMIN B-12: CPT

## 2025-01-10 PROCEDURE — 86803 HEPATITIS C AB TEST: CPT

## 2025-01-10 PROCEDURE — 83550 IRON BINDING TEST: CPT

## 2025-01-10 PROCEDURE — 82728 ASSAY OF FERRITIN: CPT

## 2025-01-10 PROCEDURE — 36415 COLL VENOUS BLD VENIPUNCTURE: CPT

## 2025-01-10 PROCEDURE — 85025 COMPLETE CBC W/AUTO DIFF WBC: CPT

## 2025-01-10 PROCEDURE — 83540 ASSAY OF IRON: CPT

## 2025-01-10 PROCEDURE — 82746 ASSAY OF FOLIC ACID SERUM: CPT

## 2025-01-10 RX ORDER — HYDROCHLOROTHIAZIDE 25 MG/1
25 TABLET ORAL DAILY
Qty: 90 TABLET | Refills: 1 | Status: SHIPPED | OUTPATIENT
Start: 2025-01-10

## 2025-01-10 RX ORDER — AMLODIPINE BESYLATE 5 MG/1
TABLET ORAL
COMMUNITY
Start: 2024-11-02

## 2025-01-10 RX ORDER — ZOSTER VACCINE RECOMBINANT, ADJUVANTED 50 MCG/0.5
50 KIT INTRAMUSCULAR ONCE
Qty: 2 EACH | Refills: 0 | Status: SHIPPED | OUTPATIENT
Start: 2025-01-10 | End: 2025-01-10

## 2025-01-10 RX ORDER — ATORVASTATIN CALCIUM 10 MG/1
10 TABLET, FILM COATED ORAL NIGHTLY
Qty: 90 TABLET | Refills: 1 | Status: SHIPPED | OUTPATIENT
Start: 2025-01-10

## 2025-01-10 RX ORDER — BECLOMETHASONE DIPROPIONATE HFA 40 UG/1
1 AEROSOL, METERED RESPIRATORY (INHALATION) 2 TIMES DAILY
Qty: 10.6 G | Refills: 2 | Status: CANCELLED | OUTPATIENT
Start: 2025-01-10 | End: 2026-01-05

## 2025-01-10 RX ORDER — FLUTICASONE PROPIONATE 110 UG/1
1 AEROSOL, METERED RESPIRATORY (INHALATION) 2 TIMES DAILY
Qty: 3 EACH | Refills: 3 | Status: SHIPPED | OUTPATIENT
Start: 2025-01-10

## 2025-01-10 RX ORDER — MINOCYCLINE HYDROCHLORIDE 100 MG/1
100 CAPSULE ORAL 2 TIMES DAILY WITH MEALS
COMMUNITY
Start: 2024-11-15 | End: 2025-01-10 | Stop reason: ALTCHOICE

## 2025-01-10 NOTE — PATIENT INSTRUCTIONS
Thank you for choosing [facility]  To Do:  FOR PUJA BRADLEY    MOnitor BP once a week. BP goal is <140/90  Continue with AMlodipine and hydrochlorothiazide for blood pressure  Start Flovent inhaler  Shingles shot thru pharmacy  Recommend RSV shot thru pharmacy  Have blood tests done  Follow up in 6 months          Well balanced diet recommended.    Routine exercise recommended most days during the week.  Wear sunscreen - SPF 15 or higher and reapply every 2 hours as needed.  Wear seat belts and drive safely.  Schedule regular appointments with dentist.  Schedule yearly eye exam if you wear glasses/contacts.  Yearly Flu Vaccine recommended.  Tetanus, Diptheria and Pertussis vaccine should be given every 7-10 years.  Call or come in if there are concerns regarding domestic abuse, sexually transmitted diseases, alcohol/drug addiction, depression/anxiety issues, or any further concerns.    Prevention Guidelines, Men Ages 65 and Older  Screening tests and vaccines are an important part of managing your health. Health counseling is essential, too. Below are guidelines for these, for men ages 65 and older. Talk with your healthcare provider to make sure you’re up-to-date on what you need.  Screening Who needs it How often   Abdominal aortic aneurysm Men ages 65 to 75 who have ever smoked 1 ultrasound   Alcohol misuse All men in this age group At routine exams   Blood pressure All men in this age group Every 2 years if your blood pressure is less than 120/80 mm Hg; yearly if your systolic blood pressure is 120 to 139 mm Hg, or your diastolic blood pressure reading is 80 to 89 mm Hg   Colorectal cancer All men in this age group Flexible sigmoidoscopy every 5 years, or colonoscopy every 10 years, or double-contrast barium enema every 5 years; yearly fecal occult blood test or fecal immunochemical test; or a stool DNA test as often as your healthcare provider advises; talk with your healthcare provider about  which tests are best for you and when you no longer need colonoscopies (generally after age 75)   Depression All men in this age group At routine exams   Type 2 diabetes or prediabetes All adults beginning at age 45 and adults without symptoms at any age who are overweight or obese and have 1 or more other risk factors for diabetes At least every 3 years (yearly if your blood sugar has already begun to rise)   Hepatitis C Men at increased risk for infection - talk with your healthcare provider At routine exams   High cholesterol or triglycerides All men in this age group At least every 5 years   HIV Men at increased risk for infection - talk with your healthcare provider At routine exams   Lung cancer Adults ages 55 to 80 who have smoked Yearly screening in smokers with 30 pack-year history of smoking or who quit within 15 years   Obesity All men in this age group At routine exams   Prostate cancer All men in this age group, talk to healthcare provider about risks and benefits of digital rectal exam (LUIS E) and prostate-specific antigen (PSA) screening1 At routine exams   Syphilis Men at increased risk for infection - talk with your healthcare provider At routine exams   Tuberculosis Men at increased risk for infection - talk with your healthcare provider Ask your healthcare provider   Vision All men in this age group Every 1 to 2 years; if you have a chronic health condition, ask your healthcare provider if you needs exams more often   Vaccine Who needs it How often   Chickenpox (varicella) All men in this age group who have no record of this infection or vaccine 2 doses; second dose should be given at least 4 weeks after the first dose   Hepatitis A Men at increased risk for infection - talk with your healthcare provider 2 doses given at least 6 months apart   Hepatitis B Men at increased risk for infection - talk with your healthcare provider 3 doses over 6 months; second dose should be given 1 month after the  first dose; the third dose should be given at least 2 months after the second dose and at least 4 months after the first dose   Haemophilus influenzae Type B (HIB) Men at increased risk for infection - talk with your healthcare provider 1 to 3 doses   Influenza (flu) All men in this age group  Once a year   Meningococcal Men at increased risk for infection - talk with your healthcare provider 1 or more doses   Pneumococcal conjugate vaccine (PCV13) and pneumococcal polysaccharide vaccine (PPSV23) All men in this age group 1 dose of each vaccine   Tetanus/diphtheria/  pertussis (Td/Tdap) booster All men in this age group Td every 10 years, or Tdap if you will have contact with a child younger than 12 months old   Zoster All men in this age group 1 dose   Counseling Who needs it How often   Diet and exercise Men who are overweight or obese When diagnosed, and then at routine exams   Fall prevention (exercise, vitamin D supplements) All men in this age group At routine exams   Sexually transmitted infection Men at increased risk for infection - talk with your healthcare provider At routine exams   Use of daily aspirin Men ages 45 to 79 at risk for cardiovascular health problems At routine exams   Use of tobacco and the health effects it can cause All men in this age group Every visit   [address] [place] [address] [facility]   Date Last Reviewed: 2/1/2017  © 1938-0653 [employer]. [address] [place] [address], [place] [address]. All rights reserved. This information is not intended as a substitute for professional medical care. Always follow your healthcare professional's instructions.

## 2025-01-10 NOTE — PROGRESS NOTES
Subjective:   Keith Winslow is a 64 year old male who presents for a Medicare Initial Annual Wellness visit (Once after 12 month Medicare anniversary)  and scheduled follow up of multiple significant but stable problems.           HYPERTENSION  On  Amlodipine and hydrochlorothiazide  Had angioedema from losartan  Compliant with meds  No home BP check  No Cough  Non smoker quit 2 years ago     developed visual floaters Following up with ophthalmology, [facility]          Quit smoking 3 years ago  Was rx inhaled steroid in the past but never started inhaler due to availability.  Smoked 4-5 cig a day x 50 years  Complains of a cough that he usually has in the morning.  Cough sometimes with scanty phlegm usually in the morning.  Occasional shortness of breath.  Has been followed up with cardiology who has worked up his history of shortness of breath.            History/Other:   Fall Risk Assessment:   He has been screened for Falls and is low risk.      Cognitive Assessment:   He had a completely normal cognitive assessment - see flowsheet entries     Functional Ability/Status:   Keith Winslow has some abnormal functions as listed below:  He has Vision problems based on screening of functional status.       Depression Screening (PHQ):  PHQ-9 TOTAL SCORE: 7  , done 1/10/2025   Little interest or pleasure in doing things: 1    Feeling down, depressed, or hopeless: 1    Trouble falling or staying asleep, or sleeping too much: 2     Feeling tired or having little energy: 2    Poor appetite or overeatin    If you checked off any problems, how difficult have these problems made it for you to do your work, take care of things at home, or get along with other people?: Not difficult at all    Last [place] Suicide Screening on 1/10/2025 was No Risk.         Advanced Directives:   He does NOT have a Living Will. [Do you have a living will?: No]  He does NOT have a Power of  for Health Care. [Do you have  a healthcare power of ?: No]    Discussed with patient          Patient Active Problem List   Diagnosis    Cervical spondylosis    Spinal stenosis of cervical region    DDD (degenerative disc disease), cervical    Cubital tunnel syndrome on left    Eczema    Essential hypertension    Tobacco abuse counseling    Tobacco abuse    Mixed hyperlipidemia    Sialadenitis    Prostate hypertrophy    ESPINOZA (dyspnea on exertion)     Allergies:  He is allergic to losartan.    Current Medications:  Outpatient Medications Marked as Taking for the 1/10/25 encounter (Office Visit) with Mike Hackett MD   Medication Sig    amLODIPine 5 MG Oral Tab     atorvastatin 10 MG Oral Tab Take 1 tablet (10 mg total) by mouth nightly.    fluticasone propionate 110 MCG/ACT Inhalation Aerosol Inhale 1 puff into the lungs 2 (two) times daily.    Zoster Vac Recomb Adjuvanted (SHINGRIX) 50 MCG/0.5ML Intramuscular Recon Susp Inject 50 mcg into the muscle one time for 1 dose.    hydroCHLOROthiazide 25 MG Oral Tab Take 1 tablet (25 mg total) by mouth daily.    Respiratory Syncytial Virus Vaccine 120 MCG/0.5ML Intramuscular Recon Susp Inject 0.5 mL into the muscle one time for 1 dose. Please administer vaccine at pharmacy    Beclomethasone Diprop HFA (QVAR REDIHALER) 40 MCG/ACT Inhalation Aerosol, Breath Activated Inhale 1 puff into the lungs in the morning and 1 puff before bedtime.    TRIAMCINOLONE 0.5 % External Cream APPLY TOPICALLY TO THE AFFECTED AREA TWICE DAILY. DO NOT USE FOR. NO MORE THAN 14 DAYS CONTINUOUSLY    clobetasol 0.05 % External Ointment Apply 1 Application topically 2 (two) times daily as needed. APPLY TO AFFECTED AREA    Multiple Vitamin (MULTI-VITAMIN DAILY) Oral Tab Take by mouth.       Medical History:  He  has a past medical history of Sleep apnea and Unspecified essential hypertension.  Surgical History:  He  has a past surgical history that includes Eye surgery; repair rotator cuff,acute; injection, w/wo contrast,  dx/therapeutic substance, epidural/subarachnoid; cervical/thoracic (N/A, 6/26/2014); fluor gid & loclzj ndl/cath spi dx/ther njx (6/26/2014); injection, w/wo contrast, dx/therapeutic substance, epidural/subarachnoid; cervical/thoracic (N/A, 7/17/2014); fluor gid & loclzj ndl/cath spi dx/ther njx (N/A, 7/17/2014); injection, w/wo contrast, dx/therapeutic substance, epidural/subarachnoid; cervical/thoracic (N/A, 8/7/2014); and fluor gid & loclzj ndl/cath spi dx/ther njx (N/A, 8/7/2014).   Family History:  His family history includes Hypertension in his mother.  Social History:  He  reports that he has quit smoking. He has never been exposed to tobacco smoke. He has never used smokeless tobacco. He reports current alcohol use. He reports that he does not use drugs.    Tobacco:  He smoked tobacco in the past but quit greater than 12 months ago.  Social History     Tobacco Use   Smoking Status Former    Passive exposure: Never   Smokeless Tobacco Never          CAGE Alcohol Screen:   CAGE screening score of 0 on 1/10/2025, showing low risk of alcohol abuse.      Patient Care Team:  Mike Hackett MD as PCP - General (Family Medicine)  Vladislav Murray MD (CARDIOLOGY)  Juanito Hudson MD (UROLOGY)    Review of Systems  GENERAL: feels well otherwise  SKIN: denies any unusual skin lesions  EYES: denies blurred vision or double vision  HEENT: denies nasal congestion, sinus pain or ST  LUNGS: denies shortness of breath with exertion  CARDIOVASCULAR: denies chest pain on exertion  GI: denies abdominal pain, denies heartburn  MUSCULOSKELETAL: denies back pain  NEURO: denies headaches  PSYCHE: denies depression or anxiety  HEMATOLOGIC: denies hx of anemia  ENDOCRINE: denies thyroid history  ALL/ASTHMA: denies hx of allergy or asthma    Objective:   Physical Exam  General Appearance:  Alert, cooperative, no distress, appears stated age   Head:  Normocephalic, without obvious abnormality, atraumatic   Eyes:  PERRL,  conjunctiva/corneas clear, EOM's intact, both eyes   Ears:  Normal TM's and external ear canals, both ears   Nose: Nares normal, septum midline, mucosa normal, no drainage or sinus tenderness   Throat: Lips, mucosa, and tongue normal; teeth and gums normal   Neck: Supple, symmetrical, trachea midline, no adenopathy, thyroid: not enlarged, symmetric, no tenderness/mass/nodules, no carotid bruit or JVD   Back:   Symmetric, no curvature, ROM normal, no CVA tenderness   Lungs:   Clear to auscultation bilaterally, respirations unlabored   Chest Wall:  No tenderness or deformity   Heart:  Regular rate and rhythm, S1, S2 normal, no murmur, rub or gallop   Abdomen:   Soft, non-tender, bowel sounds active all four quadrants,  no masses, no organomegaly   Extremities: Extremities normal, atraumatic, no cyanosis or edema   Pulses: 2+ and symmetric   Skin: Skin color, texture, turgor normal, no rashes or lesions   Lymph nodes: Cervical, supraclavicular, and axillary nodes normal   Neurologic: Normal     /78 (BP Location: Left arm, Patient Position: Sitting, Cuff Size: adult)   Pulse 88   Temp 97.7 °F (36.5 °C) (Temporal)   Resp 16   Ht 6' 1\" (1.854 m)   Wt 208 lb (94.3 kg)   SpO2 98%   BMI 27.44 kg/m²  Estimated body mass index is 27.44 kg/m² as calculated from the following:    Height as of this encounter: 6' 1\" (1.854 m).    Weight as of this encounter: 208 lb (94.3 kg).    Medicare Hearing Assessment:   Hearing Screening    Time taken: 1/10/2025 10:58 AM  Screening Method: Whisper Test  Whisper Test Result: Pass               Assessment & Plan:   Keith Winslow is a 64 year old male who presents for a Medicare Assessment.         1. Encounter for annual health examination (Primary)  2. Simple chronic bronchitis (HCC)  -     Fluticasone Propionate HFA; Inhale 1 puff into the lungs 2 (two) times daily.  Dispense: 3 each; Refill: 3  Will represcribe Flovent.    3. Need for hepatitis C screening test  -     HCV  Antibody; Future; Expected date: 01/10/2025  4. Need for vaccination  -     Shingrix; Inject 50 mcg into the muscle one time for 1 dose.  Dispense: 2 each; Refill: 0    5. Anemia, unspecified type  -     CBC With Differential With Platelet; Future; Expected date: 01/10/2025  6. Essential hypertension  -     hydroCHLOROthiazide; Take 1 tablet (25 mg total) by mouth daily.  Dispense: 90 tablet; Refill: 1  Stable continue with hydrochlorothiazide and amlodipine.  Encouraged BP monitoring and discussed BP goal    7. Hypercholesterolemia  -     Atorvastatin Calcium; Take 1 tablet (10 mg total) by mouth nightly.  Dispense: 90 tablet; Refill: 1  Stable as of last labs continue with atorvastatin.  Will recheck lipids on next office visit    8. Need for RSV immunization  -     RSVPreF3 Vac Recomb Adjuvanted; Inject 0.5 mL into the muscle one time for 1 dose. Please administer vaccine at pharmacy  Dispense: 1 each; Refill: 0  9. Need for shingles vaccine  -     Zoster Vac Recomb Adjuvanted; Inject 50 mcg into the muscle once for 1 dose. Please administer vaccine at pharmacy  Dispense: 1 each; Refill: 1  Other orders  -     High Dose Fluzone Trivalent, 65yrs+    The patient indicates understanding of these issues and agrees to the plan.        No follow-ups on file.     Mike Hackett MD, 1/10/2025     Supplementary Documentation:   General Health:  In the past six months, have you lost more than 10 pounds without trying?: 2 - No  Has your appetite been poor?: No  Type of Diet: Balanced  How does the patient maintain a good energy level?: Appropriate Exercise;Daily Walks;Stretching;Other  How would you describe your daily physical activity?: Moderate  How would you describe your current health state?: Good  How do you maintain positive mental well-being?: Social Interaction;Puzzles;Games;Visiting Friends;Visiting Family  At any time do you feel concerned for the safety/well-being of yourself and/or your children, in your  home or elsewhere?: No  Have you had any immunizations at another office such as Influenza, Hepatitis B, Tetanus, or Pneumococcal?: No

## 2025-01-11 LAB
FOLATE SERPL-MCNC: 28.6 NG/ML (ref 5.4–?)
VIT B12 SERPL-MCNC: 821 PG/ML (ref 211–911)

## 2025-01-13 ENCOUNTER — TELEPHONE (OUTPATIENT)
Dept: FAMILY MEDICINE CLINIC | Facility: CLINIC | Age: 65
End: 2025-01-13

## 2025-01-13 PROBLEM — E61.1 IRON DEFICIENCY: Status: ACTIVE | Noted: 2025-01-13

## 2025-01-13 NOTE — TELEPHONE ENCOUNTER
Received fax from [facility]: alternative requested for FLUTICASONE PROP HFA 110MCG    Insurance preferred is QVAR for 30 day supply.     Please advise, thank you.

## 2025-01-15 DIAGNOSIS — Z12.11 SCREENING FOR MALIGNANT NEOPLASM OF COLON: Primary | ICD-10-CM

## 2025-01-15 DIAGNOSIS — D50.9 IRON DEFICIENCY ANEMIA, UNSPECIFIED IRON DEFICIENCY ANEMIA TYPE: ICD-10-CM

## 2025-01-19 RX ORDER — BECLOMETHASONE DIPROPIONATE HFA 80 UG/1
1 AEROSOL, METERED RESPIRATORY (INHALATION) 2 TIMES DAILY
Qty: 1 EACH | Refills: 5 | Status: SHIPPED | OUTPATIENT
Start: 2025-01-19

## 2025-02-06 ENCOUNTER — OFFICE VISIT (OUTPATIENT)
Dept: FAMILY MEDICINE CLINIC | Facility: CLINIC | Age: 65
End: 2025-02-06
Payer: COMMERCIAL

## 2025-02-06 VITALS
WEIGHT: 216 LBS | DIASTOLIC BLOOD PRESSURE: 70 MMHG | RESPIRATION RATE: 25 BRPM | HEIGHT: 73 IN | OXYGEN SATURATION: 98 % | BODY MASS INDEX: 28.63 KG/M2 | SYSTOLIC BLOOD PRESSURE: 130 MMHG | HEART RATE: 105 BPM

## 2025-02-06 DIAGNOSIS — M54.50 ACUTE RIGHT-SIDED LOW BACK PAIN WITHOUT SCIATICA: Primary | ICD-10-CM

## 2025-02-06 PROCEDURE — 3075F SYST BP GE 130 - 139MM HG: CPT | Performed by: EMERGENCY MEDICINE

## 2025-02-06 PROCEDURE — 99213 OFFICE O/P EST LOW 20 MIN: CPT | Performed by: EMERGENCY MEDICINE

## 2025-02-06 PROCEDURE — 1159F MED LIST DOCD IN RCRD: CPT | Performed by: EMERGENCY MEDICINE

## 2025-02-06 PROCEDURE — 1160F RVW MEDS BY RX/DR IN RCRD: CPT | Performed by: EMERGENCY MEDICINE

## 2025-02-06 PROCEDURE — G2211 COMPLEX E/M VISIT ADD ON: HCPCS | Performed by: EMERGENCY MEDICINE

## 2025-02-06 PROCEDURE — 3078F DIAST BP <80 MM HG: CPT | Performed by: EMERGENCY MEDICINE

## 2025-02-06 PROCEDURE — 3008F BODY MASS INDEX DOCD: CPT | Performed by: EMERGENCY MEDICINE

## 2025-02-06 RX ORDER — METHYLPREDNISOLONE 4 MG/1
TABLET ORAL
Qty: 21 EACH | Refills: 0 | Status: SHIPPED | OUTPATIENT
Start: 2025-02-06

## 2025-02-06 RX ORDER — ACETAMINOPHEN AND CODEINE PHOSPHATE 300; 30 MG/1; MG/1
1-2 TABLET ORAL EVERY 6 HOURS PRN
Qty: 30 TABLET | Refills: 0 | Status: SHIPPED | OUTPATIENT
Start: 2025-02-06 | End: 2025-02-13

## 2025-02-06 RX ORDER — CYCLOBENZAPRINE HCL 10 MG
10 TABLET ORAL 3 TIMES DAILY PRN
Qty: 30 TABLET | Refills: 1 | Status: SHIPPED | OUTPATIENT
Start: 2025-02-06

## 2025-02-06 NOTE — PROGRESS NOTES
Chief Complaint:   Chief Complaint   Patient presents with    Hip Pain     Woke up with soreness about 2 weeks ago. Pain radiates towards the back. Sharp shooting pain, gets worse as day continues.      HPI:   This is a 65 year old male         HIP BACK PAIN  C/O pain to right hip. Ongoing for the past few weeks. Worse with prolonged ambulation or standing. Better with rest  No history of injury.  Patient points and gestures to the right gluteus as being painful.  Radiates down into the back of the right thigh.  Worse when he extends his torso.  Patient denies any radiation of pain denies any bowel or bladder incontinence no history of any fever or injury.  OTC tylenol and motrin did not help  Pain also worse with weight bearing      PMSH       Past Medical History:    Sleep apnea    Unspecified essential hypertension     Past Surgical History:   Procedure Laterality Date    Eye surgery      Fluor gid & loclzj ndl/cath spi dx/ther njx  6/26/2014    Procedure: ;  Surgeon: Aleksandr Rendon MD;  Location: [facility]    Fluor gid & loclzj ndl/cath spi dx/ther njx N/A 7/17/2014    Procedure: CERVICAL EPIDURAL;  Surgeon: Aleksandr Rendon MD;  Location: [facility]    Fluor gid & loclzj ndl/cath spi dx/ther njx N/A 8/7/2014    Procedure: CERVICAL EPIDURAL;  Surgeon: Aleksandr Rendon MD;  Location: [facility]    Injection, w/wo contrast, dx/therapeutic substance, epidural/subarachnoid; cervical/thoracic N/A 6/26/2014    Procedure: CERVICAL EPIDURAL;  Surgeon: Aleksandr Rendon MD;  Location: [facility]    Injection, w/wo contrast, dx/therapeutic substance, epidural/subarachnoid; cervical/thoracic N/A 7/17/2014    Procedure: CERVICAL EPIDURAL;  Surgeon: Aleksandr Rendon MD;  Location: [facility]    Injection, w/wo contrast, dx/therapeutic substance, epidural/subarachnoid; cervical/thoracic N/A 8/7/2014    Procedure: CERVICAL  EPIDURAL;  Surgeon: Aleksandr Rendon MD;  Location: [facility]    Repair rotator cuff,acute       Social History:  Social History     Socioeconomic History    Marital status:    Tobacco Use    Smoking status: Former     Passive exposure: Never    Smokeless tobacco: Never   Vaping Use    Vaping status: Never Used   Substance and Sexual Activity    Alcohol use: Yes     Comment: occ    Drug use: No    Sexual activity: Not Currently     Partners: Female   Other Topics Concern    Caffeine Concern Yes    Exercise Yes    Seat Belt Yes    Special Diet No    Stress Concern Yes    Weight Concern Yes     Service No    Blood Transfusions No    Occupational Exposure No    Hobby Hazards No    Sleep Concern Yes    Back Care No    Bike Helmet No    Self-Exams No     Social Drivers of Health     Food Insecurity: No Food Insecurity (1/10/2025)    NCSS - Food Insecurity     Worried About Running Out of Food in the Last Year: No     Ran Out of Food in the Last Year: No   Transportation Needs: No Transportation Needs (1/10/2025)    NCSS - Transportation     Lack of Transportation: No   Housing Stability: Not At Risk (1/10/2025)    NCSS - Housing/Utilities     Has Housing: Yes     Worried About Losing Housing: No     Unable to Get Utilities: No     Family History:  Family History   Problem Relation Age of Onset    Hypertension Mother      Allergies:  Allergies[1]  Current Meds:  Medications Ordered Prior to Encounter[2]   Counseling given: Not Answered         PROBLEM LIST     Patient Active Problem List   Diagnosis    Cervical spondylosis    Spinal stenosis of cervical region    DDD (degenerative disc disease), cervical    Cubital tunnel syndrome on left    Eczema    Essential hypertension    Tobacco abuse counseling    Tobacco abuse    Mixed hyperlipidemia    Sialadenitis    Prostate hypertrophy    ESPINOZA (dyspnea on exertion)    Anemia    Hypercholesterolemia    Iron deficiency         REVIEW OF SYSTEMS:    Review of systems significant for right low back pain.  The rest of the review of systems is negative except those stated as above    PHYSICAL EXAM:   /70   Pulse 105   Resp 25   Ht 6' 1\" (1.854 m)   Wt 216 lb (98 kg)   SpO2 98%   BMI 28.50 kg/m²  Estimated body mass index is 28.5 kg/m² as calculated from the following:    Height as of this encounter: 6' 1\" (1.854 m).    Weight as of this encounter: 216 lb (98 kg).   Vital signs reviewed.Appears stated age, well groomed.  GENERAL: well developed, well nourished, well hydrated, no distress  SKIN: good skin turgor, no obvious rashes  HEENT: atraumatic, normocephalic, ears, nose and throat are clear  EYES: sclera non icteric bilateral  NECK: supple, no adenopathy, no thyromegaly  LUNGS: clear to auscultation, no RRW  CARDIO: RRR without murmur  EXTREMITIES: no cyanosis, clubbing or edema  GI:soft Nontender Normoactive BS.  No palpable masses no guarding rigidity no rebound tenderness      Findings:  Perithoracic tenderness:  none  Perilumbar tenderness:  right  SL Joint tenderness:  none  SLR Positive:  neg  Sacrum / Coccyx Tenderness:  No  Normal leg sensation:  Yes  Normal toe / heel walking:  Yes  Normal pedal pulses:  Yes  Restricted range of motion: Yes, positive decreased range of motion truncal extension secondary to pain  CVA tenderness: No    Positive tenderness palpated over the right gluteal area and along the right sacroiliac joint.  He experiences pain with extension of the torso and the right hip.  No tenderness palpated over the right greater trochanteric area.    Recent Results (from the past 4 weeks)   Iron And Tibc    Collection Time: 01/10/25 11:07 AM   Result Value Ref Range    Iron 68 65 - 175 ug/dL    Transferrin 337 215 - 365 mg/dL    Total Iron Binding Capacity 426 (H) 250 - 425 ug/dL    % Saturation 16 (L) 20 - 50 %   Ferritin    Collection Time: 01/10/25 11:07 AM   Result Value Ref Range    Ferritin 16 (L) 50 - 336 ng/mL    Vitamin B12    Collection Time: 01/10/25 11:07 AM   Result Value Ref Range    Vitamin B12 821 211 - 911 pg/mL   Folic Acid Serum (Folate)    Collection Time: 01/10/25 11:07 AM   Result Value Ref Range    Folate (Folic Acid) 28.6 >5.4 ng/mL   HCV AB for  1945 thru 1965 (Once in a lifetime)    Collection Time: 01/10/25 11:07 AM   Result Value Ref Range    Hepatitis C Virus Nonreactive Nonreactive    CBC With Differential With Platelet    Collection Time: 01/10/25 11:07 AM   Result Value Ref Range    WBC 6.2 4.0 - 11.0 x10(3) uL    RBC 4.62 3.80 - 5.80 x10(6)uL    HGB 13.6 13.0 - 17.5 g/dL    HCT 41.2 39.0 - 53.0 %    .0 150.0 - 450.0 10(3)uL    MCV 89.2 80.0 - 100.0 fL    MCH 29.4 26.0 - 34.0 pg    MCHC 33.0 31.0 - 37.0 g/dL    RDW 13.6 %    Neutrophil Absolute Prelim 3.75 1.50 - 7.70 x10 (3) uL    Neutrophil Absolute 3.75 1.50 - 7.70 x10(3) uL    Lymphocyte Absolute 1.49 1.00 - 4.00 x10(3) uL    Monocyte Absolute 0.65 0.10 - 1.00 x10(3) uL    Eosinophil Absolute 0.18 0.00 - 0.70 x10(3) uL    Basophil Absolute 0.06 0.00 - 0.20 x10(3) uL    Immature Granulocyte Absolute 0.02 0.00 - 1.00 x10(3) uL    Neutrophil % 61.0 %    Lymphocyte % 24.2 %    Monocyte % 10.6 %    Eosinophil % 2.9 %    Basophil % 1.0 %    Immature Granulocyte % 0.3 %           ASSESSMENT AND PLAN:         1. Acute right-sided low back pain without sciatica  - XR SACROILIAC JOINTS (MIN 3 VIEWS) (CPT=72202); Future  - cyclobenzaprine 10 MG Oral Tab; Take 1 tablet (10 mg total) by mouth 3 (three) times daily as needed for Muscle spasms.  Dispense: 30 tablet; Refill: 1  - Physical Therapy Referral - [facility]  - methylPREDNISolone 4 MG Oral Tablet Therapy Pack; Use as directed  Dispense: 21 each; Refill: 0  - acetaminophen-codeine 300-30 MG Oral Tab; Take 1-2 tablets by mouth every 6 (six) hours as needed.  Dispense: 30 tablet; Refill: 0        PATIENT INSTRUCTIONS:    X ray right hip  Arrange for physical therapy  Take muscle relaxant  as needed  Take tylenol with codeine for severe pain  Take steroid pack as directed  Gentle massage                   [1]   Allergies  Allergen Reactions    Losartan ANGIOEDEMA   [2]   Current Outpatient Medications on File Prior to Visit   Medication Sig Dispense Refill    Beclomethasone Diprop HFA (QVAR REDIHALER) 80 MCG/ACT Inhalation Aerosol, Breath Activated Inhale 1 puff into the lungs in the morning and 1 puff before bedtime. 1 each 5    amLODIPine 5 MG Oral Tab       atorvastatin 10 MG Oral Tab Take 1 tablet (10 mg total) by mouth nightly. 90 tablet 1    hydroCHLOROthiazide 25 MG Oral Tab Take 1 tablet (25 mg total) by mouth daily. 90 tablet 1    Beclomethasone Diprop HFA (QVAR REDIHALER) 40 MCG/ACT Inhalation Aerosol, Breath Activated Inhale 1 puff into the lungs in the morning and 1 puff before bedtime. 10.6 g 2    TRIAMCINOLONE 0.5 % External Cream APPLY TOPICALLY TO THE AFFECTED AREA TWICE DAILY. DO NOT USE FOR. NO MORE THAN 14 DAYS CONTINUOUSLY 30 g 2    clobetasol 0.05 % External Ointment Apply 1 Application topically 2 (two) times daily as needed. APPLY TO AFFECTED AREA      Multiple Vitamin (MULTI-VITAMIN DAILY) Oral Tab Take by mouth.       No current facility-administered medications on file prior to visit.

## 2025-02-06 NOTE — PROGRESS NOTES
Chief Complaint:   Chief Complaint   Patient presents with    Hip Pain     Woke up with soreness about 2 weeks ago. Pain radiates towards the back. Sharp shooting pain, gets worse as day continues.      HPI:   This is a 65 year old male         PROBLEM***          PROBLEM***        PROBLEM***    PMSH       Past Medical History:    Sleep apnea    Unspecified essential hypertension     Past Surgical History:   Procedure Laterality Date    Eye surgery      Fluor gid & loclzj ndl/cath spi dx/ther njx  6/26/2014    Procedure: ;  Surgeon: Aleksandr Rendon MD;  Location: [facility]    Fluor gid & loclzj ndl/cath spi dx/ther njx N/A 7/17/2014    Procedure: CERVICAL EPIDURAL;  Surgeon: Aleksandr Rendon MD;  Location: [facility]    Fluor gid & loclzj ndl/cath spi dx/ther njx N/A 8/7/2014    Procedure: CERVICAL EPIDURAL;  Surgeon: Aleksandr Rendon MD;  Location: [facility]    Injection, w/wo contrast, dx/therapeutic substance, epidural/subarachnoid; cervical/thoracic N/A 6/26/2014    Procedure: CERVICAL EPIDURAL;  Surgeon: Aleksandr Rendon MD;  Location: [facility]    Injection, w/wo contrast, dx/therapeutic substance, epidural/subarachnoid; cervical/thoracic N/A 7/17/2014    Procedure: CERVICAL EPIDURAL;  Surgeon: Aleksandr Rendon MD;  Location: [facility]    Injection, w/wo contrast, dx/therapeutic substance, epidural/subarachnoid; cervical/thoracic N/A 8/7/2014    Procedure: CERVICAL EPIDURAL;  Surgeon: Aleksandr Rendon MD;  Location: [facility]    Repair rotator cuff,acute       Social History:  Social History     Socioeconomic History    Marital status:    Tobacco Use    Smoking status: Former     Passive exposure: Never    Smokeless tobacco: Never   Vaping Use    Vaping status: Never Used   Substance and Sexual Activity    Alcohol use: Yes     Comment: occ    Drug use: No    Sexual activity: Not  Currently     Partners: Female   Other Topics Concern    Caffeine Concern Yes    Exercise Yes    Seat Belt Yes    Special Diet No    Stress Concern Yes    Weight Concern Yes     Service No    Blood Transfusions No    Occupational Exposure No    Hobby Hazards No    Sleep Concern Yes    Back Care No    Bike Helmet No    Self-Exams No     Social Drivers of Health     Food Insecurity: No Food Insecurity (1/10/2025)    NCSS - Food Insecurity     Worried About Running Out of Food in the Last Year: No     Ran Out of Food in the Last Year: No   Transportation Needs: No Transportation Needs (1/10/2025)    NCSS - Transportation     Lack of Transportation: No   Housing Stability: Not At Risk (1/10/2025)    NCSS - Housing/Utilities     Has Housing: Yes     Worried About Losing Housing: No     Unable to Get Utilities: No     Family History:  Family History   Problem Relation Age of Onset    Hypertension Mother      Allergies:  Allergies[1]  Current Meds:  Medications Ordered Prior to Encounter[2]   Counseling given: Not Answered         PROBLEM LIST     Patient Active Problem List   Diagnosis    Cervical spondylosis    Spinal stenosis of cervical region    DDD (degenerative disc disease), cervical    Cubital tunnel syndrome on left    Eczema    Essential hypertension    Tobacco abuse counseling    Tobacco abuse    Mixed hyperlipidemia    Sialadenitis    Prostate hypertrophy    ESPINOZA (dyspnea on exertion)    Anemia    Hypercholesterolemia    Iron deficiency         REVIEW OF SYSTEMS:   Review of systems significant for ***  The rest of the review of systems is negative except those stated as above    PHYSICAL EXAM:   /70   Pulse 105   Resp 25   Ht 6' 1\" (1.854 m)   Wt 216 lb (98 kg)   SpO2 98%   BMI 28.50 kg/m²  Estimated body mass index is 28.5 kg/m² as calculated from the following:    Height as of this encounter: 6' 1\" (1.854 m).    Weight as of this encounter: 216 lb (98 kg).   Vital signs reviewed.Appears  stated age, well groomed.  GENERAL: well developed, well nourished, well hydrated, no distress  SKIN: good skin turgor, no obvious rashes  HEENT: atraumatic, normocephalic, ears, nose and throat are clear  EYES: sclera non icteric bilateral  NECK: supple, no adenopathy, no thyromegaly  LUNGS: clear to auscultation, no RRW  CARDIO: RRR without murmur  EXTREMITIES: no cyanosis, clubbing or edema  GI:soft Nontender Normoactive BS.  No palpable masses no guarding rigidity no rebound tenderness    Recent Results (from the past 4 weeks)   Iron And Tibc    Collection Time: 01/10/25 11:07 AM   Result Value Ref Range    Iron 68 65 - 175 ug/dL    Transferrin 337 215 - 365 mg/dL    Total Iron Binding Capacity 426 (H) 250 - 425 ug/dL    % Saturation 16 (L) 20 - 50 %   Ferritin    Collection Time: 01/10/25 11:07 AM   Result Value Ref Range    Ferritin 16 (L) 50 - 336 ng/mL   Vitamin B12    Collection Time: 01/10/25 11:07 AM   Result Value Ref Range    Vitamin B12 821 211 - 911 pg/mL   Folic Acid Serum (Folate)    Collection Time: 01/10/25 11:07 AM   Result Value Ref Range    Folate (Folic Acid) 28.6 >5.4 ng/mL   HCV AB for  1945 thru 1965 (Once in a lifetime)    Collection Time: 01/10/25 11:07 AM   Result Value Ref Range    Hepatitis C Virus Nonreactive Nonreactive    CBC With Differential With Platelet    Collection Time: 01/10/25 11:07 AM   Result Value Ref Range    WBC 6.2 4.0 - 11.0 x10(3) uL    RBC 4.62 3.80 - 5.80 x10(6)uL    HGB 13.6 13.0 - 17.5 g/dL    HCT 41.2 39.0 - 53.0 %    .0 150.0 - 450.0 10(3)uL    MCV 89.2 80.0 - 100.0 fL    MCH 29.4 26.0 - 34.0 pg    MCHC 33.0 31.0 - 37.0 g/dL    RDW 13.6 %    Neutrophil Absolute Prelim 3.75 1.50 - 7.70 x10 (3) uL    Neutrophil Absolute 3.75 1.50 - 7.70 x10(3) uL    Lymphocyte Absolute 1.49 1.00 - 4.00 x10(3) uL    Monocyte Absolute 0.65 0.10 - 1.00 x10(3) uL    Eosinophil Absolute 0.18 0.00 - 0.70 x10(3) uL    Basophil Absolute 0.06 0.00 - 0.20 x10(3) uL    Immature  Granulocyte Absolute 0.02 0.00 - 1.00 x10(3) uL    Neutrophil % 61.0 %    Lymphocyte % 24.2 %    Monocyte % 10.6 %    Eosinophil % 2.9 %    Basophil % 1.0 %    Immature Granulocyte % 0.3 %           ASSESSMENT AND PLAN:     There are no diagnoses linked to this encounter.    There are no diagnoses linked to this encounter.      PATIENT INSTRUCTIONS:      FOLLOW UP: ***          Health Maintenance Due   Topic Date Due    Zoster Vaccines (2 of 2) 11/15/2022              [1]   Allergies  Allergen Reactions    Losartan ANGIOEDEMA   [2]   Current Outpatient Medications on File Prior to Visit   Medication Sig Dispense Refill    Beclomethasone Diprop HFA (QVAR REDIHALER) 80 MCG/ACT Inhalation Aerosol, Breath Activated Inhale 1 puff into the lungs in the morning and 1 puff before bedtime. 1 each 5    amLODIPine 5 MG Oral Tab       atorvastatin 10 MG Oral Tab Take 1 tablet (10 mg total) by mouth nightly. 90 tablet 1    hydroCHLOROthiazide 25 MG Oral Tab Take 1 tablet (25 mg total) by mouth daily. 90 tablet 1    Beclomethasone Diprop HFA (QVAR REDIHALER) 40 MCG/ACT Inhalation Aerosol, Breath Activated Inhale 1 puff into the lungs in the morning and 1 puff before bedtime. 10.6 g 2    TRIAMCINOLONE 0.5 % External Cream APPLY TOPICALLY TO THE AFFECTED AREA TWICE DAILY. DO NOT USE FOR. NO MORE THAN 14 DAYS CONTINUOUSLY 30 g 2    clobetasol 0.05 % External Ointment Apply 1 Application topically 2 (two) times daily as needed. APPLY TO AFFECTED AREA      Multiple Vitamin (MULTI-VITAMIN DAILY) Oral Tab Take by mouth.       No current facility-administered medications on file prior to visit.

## 2025-02-06 NOTE — PATIENT INSTRUCTIONS
Thank you for choosing [facility]  To Do:  FOR PUJA BRADLEY    X ray right hip  Arrange for physical therapy  Take muscle relaxant as needed  Take tylenol with codeine for severe pain  Take steroid pack as directed  Gentle massage

## 2025-02-07 ENCOUNTER — TELEPHONE (OUTPATIENT)
Dept: FAMILY MEDICINE CLINIC | Facility: CLINIC | Age: 65
End: 2025-02-07

## 2025-02-07 NOTE — TELEPHONE ENCOUNTER
Pt called requesting note for work from 02/06/25 returning 02/11/25.    per pt he has x-ray appt on 02/08/25 and wants to give himself a few days since he works on his feet all day long.     Please advise

## 2025-02-08 ENCOUNTER — HOSPITAL ENCOUNTER (OUTPATIENT)
Dept: GENERAL RADIOLOGY | Age: 65
Discharge: HOME | End: 2025-02-08
Attending: EMERGENCY MEDICINE
Payer: MEDICARE

## 2025-02-08 DIAGNOSIS — M54.50 ACUTE RIGHT-SIDED LOW BACK PAIN WITHOUT SCIATICA: ICD-10-CM

## 2025-02-08 PROCEDURE — 72202 X-RAY EXAM SI JOINTS 3/> VWS: CPT | Performed by: EMERGENCY MEDICINE

## 2025-02-10 ENCOUNTER — OFFICE VISIT (OUTPATIENT)
Dept: FAMILY MEDICINE CLINIC | Facility: CLINIC | Age: 65
End: 2025-02-10
Payer: COMMERCIAL

## 2025-02-10 VITALS
HEIGHT: 73 IN | DIASTOLIC BLOOD PRESSURE: 70 MMHG | WEIGHT: 214 LBS | SYSTOLIC BLOOD PRESSURE: 118 MMHG | RESPIRATION RATE: 14 BRPM | OXYGEN SATURATION: 98 % | BODY MASS INDEX: 28.36 KG/M2 | HEART RATE: 118 BPM

## 2025-02-10 DIAGNOSIS — M54.50 ACUTE RIGHT-SIDED LOW BACK PAIN WITHOUT SCIATICA: Primary | ICD-10-CM

## 2025-02-10 PROCEDURE — 1160F RVW MEDS BY RX/DR IN RCRD: CPT | Performed by: EMERGENCY MEDICINE

## 2025-02-10 PROCEDURE — 99213 OFFICE O/P EST LOW 20 MIN: CPT | Performed by: EMERGENCY MEDICINE

## 2025-02-10 PROCEDURE — 3008F BODY MASS INDEX DOCD: CPT | Performed by: EMERGENCY MEDICINE

## 2025-02-10 PROCEDURE — 3074F SYST BP LT 130 MM HG: CPT | Performed by: EMERGENCY MEDICINE

## 2025-02-10 PROCEDURE — 3078F DIAST BP <80 MM HG: CPT | Performed by: EMERGENCY MEDICINE

## 2025-02-10 PROCEDURE — 1159F MED LIST DOCD IN RCRD: CPT | Performed by: EMERGENCY MEDICINE

## 2025-02-10 NOTE — PATIENT INSTRUCTIONS
Thank you for choosing [facility]  To Do:  FOR PUJA BRADLEY    Continue with steroids  Arrange for physical therapy  Continue with gentle massage

## 2025-02-10 NOTE — PROGRESS NOTES
Chief Complaint:   Chief Complaint   Patient presents with    Follow - Up     results     HPI:   This is a 65 year old male       BACK PAIN    Patient is here for follow-up regarding right-sided low back pain.  Overall feels improved with decreased pain.  Took first dose of steroids today.  Feels better than he was yesterday denies any bowel or bladder incontinence.  Denies any saddle anesthesia no fever denies any urinary symptoms.          PMSH       Past Medical History:    Sleep apnea    Unspecified essential hypertension     Past Surgical History:   Procedure Laterality Date    Eye surgery      Fluor gid & loclzj ndl/cath spi dx/ther njx  6/26/2014    Procedure: ;  Surgeon: Aleksandr Rendon MD;  Location: [facility]    Fluor gid & loclzj ndl/cath spi dx/ther njx N/A 7/17/2014    Procedure: CERVICAL EPIDURAL;  Surgeon: Aleksandr Rendon MD;  Location: [facility]    Fluor gid & loclzj ndl/cath spi dx/ther njx N/A 8/7/2014    Procedure: CERVICAL EPIDURAL;  Surgeon: Aleksandr Rendon MD;  Location: [facility]    Injection, w/wo contrast, dx/therapeutic substance, epidural/subarachnoid; cervical/thoracic N/A 6/26/2014    Procedure: CERVICAL EPIDURAL;  Surgeon: Aleksandr Rendon MD;  Location: [facility]    Injection, w/wo contrast, dx/therapeutic substance, epidural/subarachnoid; cervical/thoracic N/A 7/17/2014    Procedure: CERVICAL EPIDURAL;  Surgeon: Aleksandr Rendon MD;  Location: [facility]    Injection, w/wo contrast, dx/therapeutic substance, epidural/subarachnoid; cervical/thoracic N/A 8/7/2014    Procedure: CERVICAL EPIDURAL;  Surgeon: Aleksandr Rendon MD;  Location: [facility]    Repair rotator cuff,acute       Social History:  Social History     Socioeconomic History    Marital status:    Tobacco Use    Smoking status: Former     Passive exposure: Never    Smokeless tobacco: Never    Vaping Use    Vaping status: Never Used   Substance and Sexual Activity    Alcohol use: Yes     Comment: occ    Drug use: No    Sexual activity: Not Currently     Partners: Female   Other Topics Concern    Caffeine Concern Yes    Exercise Yes    Seat Belt Yes    Special Diet No    Stress Concern Yes    Weight Concern Yes     Service No    Blood Transfusions No    Occupational Exposure No    Hobby Hazards No    Sleep Concern Yes    Back Care No    Bike Helmet No    Self-Exams No     Social Drivers of Health     Food Insecurity: No Food Insecurity (1/10/2025)    NCSS - Food Insecurity     Worried About Running Out of Food in the Last Year: No     Ran Out of Food in the Last Year: No   Transportation Needs: No Transportation Needs (1/10/2025)    NCSS - Transportation     Lack of Transportation: No   Housing Stability: Not At Risk (1/10/2025)    NCSS - Housing/Utilities     Has Housing: Yes     Worried About Losing Housing: No     Unable to Get Utilities: No     Family History:  Family History   Problem Relation Age of Onset    Hypertension Mother      Allergies:  Allergies[1]  Current Meds:  Medications Ordered Prior to Encounter[2]   Counseling given: Not Answered         PROBLEM LIST     Patient Active Problem List   Diagnosis    Cervical spondylosis    Spinal stenosis of cervical region    DDD (degenerative disc disease), cervical    Cubital tunnel syndrome on left    Eczema    Essential hypertension    Tobacco abuse counseling    Tobacco abuse    Mixed hyperlipidemia    Sialadenitis    Prostate hypertrophy    ESPINOZA (dyspnea on exertion)    Anemia    Hypercholesterolemia    Iron deficiency         REVIEW OF SYSTEMS:   Review of systems significant for low back pain right sided  The rest of the review of systems is negative except those stated as above    PHYSICAL EXAM:   /70   Pulse 118   Resp 14   Ht 6' 1\" (1.854 m)   Wt 214 lb (97.1 kg)   SpO2 98%   BMI 28.23 kg/m²  Estimated body mass index is  28.23 kg/m² as calculated from the following:    Height as of this encounter: 6' 1\" (1.854 m).    Weight as of this encounter: 214 lb (97.1 kg).   Vital signs reviewed.Appears stated age, well groomed.  GENERAL: well developed, well nourished, well hydrated, no distress  SKIN: good skin turgor, no obvious rashes  HEENT: atraumatic, normocephalic, ears, nose and throat are clear  EYES: sclera non icteric bilateral  NECK: supple, no adenopathy, no thyromegaly  LUNGS: clear to auscultation, no RRW  CARDIO: RRR without murmur  EXTREMITIES: no cyanosis, clubbing or edema  Findings:  Perithoracic tenderness:  none  Perilumbar tenderness:  NO  SL Joint tenderness:  yes positive tenderness to the right sacroiliac joint  SLR Positive:  neg  Sacrum / Coccyx Tenderness:  No  Normal leg sensation:  Yes  Normal toe / heel walking:  Yes  Normal pedal pulses:  Yes  Restricted range of motion: NO   CVA tenderness: No    Heel toe walking intact. grade 5 over 5 muscle strength in bilateral hip flexion and knee extension.  Dorsiflexion of both great toes are in tact.  There is good strength bilateral dorsiflexion of both feet.  +2 patellar DTRs elicited bilaterally          ASSESSMENT AND PLAN:       1. Acute right-sided low back pain without sciatica    Most likely sacroiliitis.  Much improved today after taking steroids.  X-rays reviewed no acute osseous abnormality on x-rays of SI joint.  Continue with tapering dose of steroids/Medrol Dosepak.  Okay to return to work tomorrow work note given.  Encouraged to establish physical therapy to to help with continued improvement.  Follow-up if not improved may need referral to pain specialist if not improved           [1]   Allergies  Allergen Reactions    Losartan ANGIOEDEMA   [2]   Current Outpatient Medications on File Prior to Visit   Medication Sig Dispense Refill    cyclobenzaprine 10 MG Oral Tab Take 1 tablet (10 mg total) by mouth 3 (three) times daily as needed for Muscle spasms.  30 tablet 1    methylPREDNISolone 4 MG Oral Tablet Therapy Pack Use as directed 21 each 0    acetaminophen-codeine 300-30 MG Oral Tab Take 1-2 tablets by mouth every 6 (six) hours as needed. 30 tablet 0    Beclomethasone Diprop HFA (QVAR REDIHALER) 80 MCG/ACT Inhalation Aerosol, Breath Activated Inhale 1 puff into the lungs in the morning and 1 puff before bedtime. 1 each 5    amLODIPine 5 MG Oral Tab       atorvastatin 10 MG Oral Tab Take 1 tablet (10 mg total) by mouth nightly. 90 tablet 1    hydroCHLOROthiazide 25 MG Oral Tab Take 1 tablet (25 mg total) by mouth daily. 90 tablet 1    Beclomethasone Diprop HFA (QVAR REDIHALER) 40 MCG/ACT Inhalation Aerosol, Breath Activated Inhale 1 puff into the lungs in the morning and 1 puff before bedtime. 10.6 g 2    TRIAMCINOLONE 0.5 % External Cream APPLY TOPICALLY TO THE AFFECTED AREA TWICE DAILY. DO NOT USE FOR. NO MORE THAN 14 DAYS CONTINUOUSLY 30 g 2    clobetasol 0.05 % External Ointment Apply 1 Application topically 2 (two) times daily as needed. APPLY TO AFFECTED AREA      Multiple Vitamin (MULTI-VITAMIN DAILY) Oral Tab Take by mouth.       No current facility-administered medications on file prior to visit.

## 2025-02-14 ENCOUNTER — APPOINTMENT (OUTPATIENT)
Dept: CARDIOLOGY | Age: 65
End: 2025-02-14

## 2025-02-16 DIAGNOSIS — M54.50 ACUTE RIGHT-SIDED LOW BACK PAIN WITHOUT SCIATICA: ICD-10-CM

## 2025-02-17 RX ORDER — ACETAMINOPHEN AND CODEINE PHOSPHATE 300; 30 MG/1; MG/1
1-2 TABLET ORAL EVERY 6 HOURS PRN
Qty: 20 TABLET | Refills: 0 | Status: SHIPPED | OUTPATIENT
Start: 2025-02-17

## 2025-02-27 DIAGNOSIS — M54.50 ACUTE RIGHT-SIDED LOW BACK PAIN WITHOUT SCIATICA: ICD-10-CM

## 2025-03-03 RX ORDER — ACETAMINOPHEN AND CODEINE PHOSPHATE 300; 30 MG/1; MG/1
1-2 TABLET ORAL EVERY 6 HOURS PRN
Qty: 20 TABLET | Refills: 0 | OUTPATIENT
Start: 2025-03-03

## 2025-03-03 RX ORDER — CYCLOBENZAPRINE HCL 10 MG
10 TABLET ORAL 3 TIMES DAILY PRN
Qty: 30 TABLET | Refills: 1 | Status: SHIPPED | OUTPATIENT
Start: 2025-03-03

## 2025-03-03 NOTE — TELEPHONE ENCOUNTER
Medication Detail    Medication Quantity Refills Start End   acetaminophen-codeine 300-30 MG Oral Tab 20 tablet 0 2/17/2025 --   Sig:   Take 1-2 tablets by mouth every 6 (six) hours as needed.     Route:   Oral     Order #:   [id number]         Dr. Hackett - Would you refill? Pt requesting     Pt called. Stated he received denial of Tylenol-codeine. Pt stated he has been taking 1 tab in the AM and 1 tab in the PM. Stated has been helping with pain relief. Would like medication refill. Notified I will send to Dr. Hackett to review.
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
## 2025-03-13 ENCOUNTER — TELEPHONE (OUTPATIENT)
Dept: FAMILY MEDICINE CLINIC | Facility: CLINIC | Age: 65
End: 2025-03-13

## 2025-03-18 ENCOUNTER — OFFICE VISIT (OUTPATIENT)
Dept: SURGERY | Facility: CLINIC | Age: 65
End: 2025-03-18
Payer: COMMERCIAL

## 2025-03-18 ENCOUNTER — OFFICE VISIT (OUTPATIENT)
Dept: FAMILY MEDICINE CLINIC | Facility: CLINIC | Age: 65
End: 2025-03-18
Payer: COMMERCIAL

## 2025-03-18 VITALS
HEART RATE: 110 BPM | HEIGHT: 73 IN | WEIGHT: 215 LBS | OXYGEN SATURATION: 98 % | RESPIRATION RATE: 19 BRPM | SYSTOLIC BLOOD PRESSURE: 146 MMHG | DIASTOLIC BLOOD PRESSURE: 86 MMHG | BODY MASS INDEX: 28.49 KG/M2

## 2025-03-18 DIAGNOSIS — M54.50 ACUTE RIGHT-SIDED LOW BACK PAIN WITHOUT SCIATICA: ICD-10-CM

## 2025-03-18 DIAGNOSIS — N52.9 ERECTILE DYSFUNCTION, UNSPECIFIED ERECTILE DYSFUNCTION TYPE: ICD-10-CM

## 2025-03-18 DIAGNOSIS — N39.41 URGE INCONTINENCE: ICD-10-CM

## 2025-03-18 DIAGNOSIS — I10 ESSENTIAL HYPERTENSION: ICD-10-CM

## 2025-03-18 DIAGNOSIS — M54.50 ACUTE RIGHT-SIDED LOW BACK PAIN WITHOUT SCIATICA: Primary | ICD-10-CM

## 2025-03-18 DIAGNOSIS — R35.0 URINARY FREQUENCY: Primary | ICD-10-CM

## 2025-03-18 DIAGNOSIS — N40.1 BPH WITH OBSTRUCTION/LOWER URINARY TRACT SYMPTOMS: ICD-10-CM

## 2025-03-18 DIAGNOSIS — N13.8 BPH WITH OBSTRUCTION/LOWER URINARY TRACT SYMPTOMS: ICD-10-CM

## 2025-03-18 PROBLEM — J41.0 SMOKERS' COUGH (HCC): Chronic | Status: ACTIVE | Noted: 2025-03-18

## 2025-03-18 LAB
APPEARANCE: CLEAR
BILIRUBIN: NEGATIVE
GLUCOSE (URINE DIPSTICK): NEGATIVE MG/DL
KETONES (URINE DIPSTICK): NEGATIVE MG/DL
LEUKOCYTES: NEGATIVE
MULTISTIX LOT#: NORMAL NUMERIC
NITRITE, URINE: NEGATIVE
OCCULT BLOOD: NEGATIVE
PH, URINE: 5.5 (ref 4.5–8)
PROTEIN (URINE DIPSTICK): NEGATIVE MG/DL
SPECIFIC GRAVITY: 1.02 (ref 1–1.03)
URINE-COLOR: YELLOW
UROBILINOGEN,SEMI-QN: 0.2 MG/DL (ref 0–1.9)

## 2025-03-18 PROCEDURE — G2211 COMPLEX E/M VISIT ADD ON: HCPCS | Performed by: UROLOGY

## 2025-03-18 PROCEDURE — 51798 US URINE CAPACITY MEASURE: CPT | Performed by: UROLOGY

## 2025-03-18 PROCEDURE — 1159F MED LIST DOCD IN RCRD: CPT

## 2025-03-18 PROCEDURE — 3079F DIAST BP 80-89 MM HG: CPT

## 2025-03-18 PROCEDURE — 1160F RVW MEDS BY RX/DR IN RCRD: CPT

## 2025-03-18 PROCEDURE — 99214 OFFICE O/P EST MOD 30 MIN: CPT

## 2025-03-18 PROCEDURE — 1159F MED LIST DOCD IN RCRD: CPT | Performed by: UROLOGY

## 2025-03-18 PROCEDURE — 99214 OFFICE O/P EST MOD 30 MIN: CPT | Performed by: UROLOGY

## 2025-03-18 PROCEDURE — 81002 URINALYSIS NONAUTO W/O SCOPE: CPT | Performed by: UROLOGY

## 2025-03-18 PROCEDURE — 3008F BODY MASS INDEX DOCD: CPT

## 2025-03-18 PROCEDURE — 3077F SYST BP >= 140 MM HG: CPT

## 2025-03-18 RX ORDER — VIBEGRON 75 MG/1
1 TABLET, FILM COATED ORAL DAILY
Qty: 90 TABLET | Refills: 5 | Status: SHIPPED | OUTPATIENT
Start: 2025-03-18 | End: 2025-03-18

## 2025-03-18 RX ORDER — MIRABEGRON 50 MG/1
50 TABLET, FILM COATED, EXTENDED RELEASE ORAL DAILY
Qty: 90 TABLET | Refills: 5 | Status: SHIPPED | OUTPATIENT
Start: 2025-03-18 | End: 2025-03-18

## 2025-03-18 RX ORDER — TAMSULOSIN HYDROCHLORIDE 0.4 MG/1
0.4 CAPSULE ORAL EVERY EVENING
Qty: 90 CAPSULE | Refills: 6 | Status: SHIPPED | OUTPATIENT
Start: 2025-03-18
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
## 2025-03-19 DIAGNOSIS — M54.50 ACUTE RIGHT-SIDED LOW BACK PAIN WITHOUT SCIATICA: ICD-10-CM

## 2025-03-19 RX ORDER — ACETAMINOPHEN AND CODEINE PHOSPHATE 300; 30 MG/1; MG/1
1-2 TABLET ORAL EVERY 6 HOURS PRN
Qty: 30 TABLET | Refills: 0 | OUTPATIENT
Start: 2025-03-19

## 2025-03-19 RX ORDER — ACETAMINOPHEN AND CODEINE PHOSPHATE 300; 30 MG/1; MG/1
1-2 TABLET ORAL EVERY 6 HOURS PRN
Qty: 30 TABLET | Refills: 0 | Status: SHIPPED | OUTPATIENT
Start: 2025-03-19

## 2025-03-26 ENCOUNTER — APPOINTMENT (OUTPATIENT)
Dept: CARDIOLOGY | Age: 65
End: 2025-03-26

## 2025-03-31 ENCOUNTER — TELEPHONE (OUTPATIENT)
Dept: ORTHOPEDICS CLINIC | Facility: CLINIC | Age: 65
End: 2025-03-31

## 2025-03-31 ENCOUNTER — HOSPITAL ENCOUNTER (OUTPATIENT)
Dept: MRI IMAGING | Facility: HOSPITAL | Age: 65
Discharge: HOME | End: 2025-03-31
Payer: MEDICARE

## 2025-03-31 DIAGNOSIS — M54.50 ACUTE RIGHT-SIDED LOW BACK PAIN WITHOUT SCIATICA: ICD-10-CM

## 2025-03-31 DIAGNOSIS — M54.50 LOW BACK PAIN, UNSPECIFIED BACK PAIN LATERALITY, UNSPECIFIED CHRONICITY, UNSPECIFIED WHETHER SCIATICA PRESENT: Primary | ICD-10-CM

## 2025-03-31 DIAGNOSIS — M54.50 ACUTE RIGHT-SIDED LOW BACK PAIN WITHOUT SCIATICA: Primary | ICD-10-CM

## 2025-03-31 PROCEDURE — 72148 MRI LUMBAR SPINE W/O DYE: CPT

## 2025-03-31 NOTE — PROGRESS NOTES
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  Bilateral multilevel neural foraminal stenosis.\"    When seen in clinic last on 3/18/25, Keith Winslow reported he had trouble sitting and walking for ling periods of time and pain is constant, radiates to right hip. He also reported the acetaminophen-codeine he was taking for the pain did not provide full pain relief.    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  Bilateral multilevel neural foraminal stenosis.\"     Referral Priority:   Routine     Referral Type:   OFFICE VISIT     Referred to Provider:   Gavino Nava MD     Requested Specialty:   Surgery, Spine     Number of Visits Requested:   1

## 2025-03-31 NOTE — TELEPHONE ENCOUNTER
Patient referred to dr Nava for lower back pain. Please advise if imaging is needed  Future Appointments   Date Time Provider Department Center   4/18/2025  7:00 AM Gavino Nava MD [facility] [address]   7/17/2025  3:30 PM Mike Hackett MD [facility]   3/17/2026  9:15 AM Juanito Hudson MD [id number] [address]

## 2025-04-01 DIAGNOSIS — M54.50 ACUTE RIGHT-SIDED LOW BACK PAIN WITHOUT SCIATICA: ICD-10-CM

## 2025-04-03 NOTE — TELEPHONE ENCOUNTER
Please review.  Protocol failed/has no protocol.    Last Office Visit:02/10/2025  Please advise if refill is appropriate.    Requested Prescriptions     Pending Prescriptions Disp Refills    CYCLOBENZAPRINE 10 MG Oral Tab [Pharmacy Med Name: CYCLOBENZAPRINE 10MG TABLETS] 30 tablet 1     Sig: TAKE 1 TABLET(10 MG) BY MOUTH THREE TIMES DAILY AS NEEDED FOR MUSCLE SPASMS

## 2025-04-04 RX ORDER — CYCLOBENZAPRINE HCL 10 MG
10 TABLET ORAL 3 TIMES DAILY PRN
Qty: 90 TABLET | Refills: 2 | Status: SHIPPED | OUTPATIENT
Start: 2025-04-04

## 2025-04-14 ENCOUNTER — HOSPITAL ENCOUNTER (OUTPATIENT)
Dept: GENERAL RADIOLOGY | Age: 65
Discharge: HOME | End: 2025-04-14
Attending: STUDENT IN AN ORGANIZED HEALTH CARE EDUCATION/TRAINING PROGRAM
Payer: MEDICARE

## 2025-04-14 ENCOUNTER — OFFICE VISIT (OUTPATIENT)
Dept: ORTHOPEDICS CLINIC | Facility: CLINIC | Age: 65
End: 2025-04-14
Payer: COMMERCIAL

## 2025-04-14 VITALS — HEIGHT: 73 IN | BODY MASS INDEX: 28.49 KG/M2 | WEIGHT: 215 LBS

## 2025-04-14 DIAGNOSIS — M54.50 LOW BACK PAIN, UNSPECIFIED BACK PAIN LATERALITY, UNSPECIFIED CHRONICITY, UNSPECIFIED WHETHER SCIATICA PRESENT: ICD-10-CM

## 2025-04-14 DIAGNOSIS — M48.062 SPINAL STENOSIS OF LUMBAR REGION WITH NEUROGENIC CLAUDICATION: Primary | ICD-10-CM

## 2025-04-14 PROCEDURE — 3008F BODY MASS INDEX DOCD: CPT | Performed by: STUDENT IN AN ORGANIZED HEALTH CARE EDUCATION/TRAINING PROGRAM

## 2025-04-14 PROCEDURE — 1160F RVW MEDS BY RX/DR IN RCRD: CPT | Performed by: STUDENT IN AN ORGANIZED HEALTH CARE EDUCATION/TRAINING PROGRAM

## 2025-04-14 PROCEDURE — G2211 COMPLEX E/M VISIT ADD ON: HCPCS | Performed by: STUDENT IN AN ORGANIZED HEALTH CARE EDUCATION/TRAINING PROGRAM

## 2025-04-14 PROCEDURE — 1159F MED LIST DOCD IN RCRD: CPT | Performed by: STUDENT IN AN ORGANIZED HEALTH CARE EDUCATION/TRAINING PROGRAM

## 2025-04-14 PROCEDURE — 72100 X-RAY EXAM L-S SPINE 2/3 VWS: CPT | Performed by: STUDENT IN AN ORGANIZED HEALTH CARE EDUCATION/TRAINING PROGRAM

## 2025-04-14 PROCEDURE — 99204 OFFICE O/P NEW MOD 45 MIN: CPT | Performed by: STUDENT IN AN ORGANIZED HEALTH CARE EDUCATION/TRAINING PROGRAM

## 2025-04-14 RX ORDER — MELOXICAM 15 MG/1
15 TABLET ORAL DAILY
Qty: 30 TABLET | Refills: 0 | Status: SHIPPED | OUTPATIENT
Start: 2025-04-14

## 2025-04-14 RX ORDER — LATANOPROST 50 UG/ML
1 SOLUTION/ DROPS OPHTHALMIC NIGHTLY
COMMUNITY
Start: 2025-03-25
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
## 2025-04-20 ENCOUNTER — PATIENT MESSAGE (OUTPATIENT)
Facility: CLINIC | Age: 65
End: 2025-04-20

## 2025-04-21 NOTE — TELEPHONE ENCOUNTER
LOV was 04/14/2025- spinal stenosis of lumbar    Pt states that the Meloxicam that was Rx'd is causing swelling and pain in his feet and ankles, along with numbness~    He states it went down after discontinuing it.     Is there something else that he can try?    Please advise~

## 2025-05-07 ENCOUNTER — TELEPHONE (OUTPATIENT)
Dept: PHYSICAL THERAPY | Facility: HOSPITAL | Age: 65
End: 2025-05-07

## 2025-05-08 ENCOUNTER — OFFICE VISIT (OUTPATIENT)
Dept: PHYSICAL THERAPY | Age: 65
End: 2025-05-08
Attending: STUDENT IN AN ORGANIZED HEALTH CARE EDUCATION/TRAINING PROGRAM
Payer: MEDICARE

## 2025-05-08 DIAGNOSIS — M48.062 SPINAL STENOSIS OF LUMBAR REGION WITH NEUROGENIC CLAUDICATION: Primary | ICD-10-CM

## 2025-05-08 PROCEDURE — 97110 THERAPEUTIC EXERCISES: CPT | Performed by: PHYSICAL THERAPIST

## 2025-05-08 PROCEDURE — 97161 PT EVAL LOW COMPLEX 20 MIN: CPT | Performed by: PHYSICAL THERAPIST

## 2025-05-09 ENCOUNTER — E-ADVICE (OUTPATIENT)
Dept: CARDIOLOGY | Age: 65
End: 2025-05-09

## 2025-05-09 NOTE — PROGRESS NOTES
SPINE EVALUATION:     Diagnosis:   Spinal stenosis of lumbar region with neurogenic claudication (M48.062) Patient:  Keith Winslow (65 year old, male)        Referring Provider: Gavino Nava  Today's Date   5/9/2025    Precautions:  Drug Allergy   Date of Evaluation: 05/08/25  Next MD visit: No data recorded  Date of Surgery: NA     PATIENT SUMMARY   Summary of chief complaints: right side back/hip pain  History of current condition: woke up with right hip pain. Tried chiropractic with no relief   Pain level: current 7 /10, at best 5 /10, at worst 10 /10  Description of symptoms: lifting wrong way - pain. Relief with sitting   Occupation: Works at [employer]   Leisure activities/Hobbies: rides motorcycles, walking for ex on [email or web address]   Prior level of function: Independent with ADLS  Current limitations: increased pain standing  Pt goals: To get relief from pain and get back my ability to stand and walk without pain.  Red flag signs/symptoms: Pt denies pain that wakes in sleep, fever, recent trauma, history of CA, pain unchanged with movement/activity; Pt denies dizziness, drop attacks, dysphagia, dysarthria, diplopia; Pt denies changes in bowel/bladder function, saddle anesthesia    Past medical history was reviewed with Keith.  Significant findings include: bulging  cervical disc with injections   - pain was left hand  Imaging/Tests:     Keith  has a past medical history of Allergic rhinitis (2 years), Sleep apnea, and Unspecified essential hypertension.  He  has a past surgical history that includes Eye surgery; repair rotator cuff,acute; injection, w/wo contrast, dx/therapeutic substance, epidural/subarachnoid; cervical/thoracic (N/A, 06/26/2014); fluor gid & loclzj ndl/cath spi dx/ther njx (06/26/2014); injection, w/wo contrast, dx/therapeutic substance, epidural/subarachnoid; cervical/thoracic (N/A, 07/17/2014); fluor gid & loclzj ndl/cath spi dx/ther njx (N/A, 07/17/2014); injection, w/wo  contrast, dx/therapeutic substance, epidural/subarachnoid; cervical/thoracic (N/A, 08/07/2014); fluor gid & loclzj ndl/cath spi dx/ther njx (N/A, 08/07/2014); cataract (Left eye); and other surgical history (Left eye 2001, throat 1995).    ASSESSMENT  Keith presents to physical therapy evaluation with primary c/o right side back/hip pain. The results of the objective tests and measures show overall strength and ROM WNL. Transient pain with transverse plane isometrics in standing, mild right hip pain with IR closed chain. Functional deficits include but are not limited to increased pain standing. Signs and symptoms are consistent with diagnosis of Spinal stenosis of lumbar region with neurogenic claudication (M48.062). Pt and PT discussed evaluation findings, pathology, POC and HEP.  Pt voiced understanding and performs HEP correctly without reported pain. Skilled Physical Therapy is medically necessary to address the above impairments and reach functional goals.    OBJECTIVE:    Musculoskeletal:  Observation/Posture: forward head posture (decreased lumbar lordosis)   Accessory Motion: no pain with T/L PAS   Palpation: No soft tissue tenderness     Special tests:   SLR/slump - negative. Cervical screen negative    Squat - no pain   Quadruped - rock flex/hip hinge - no pain     ROM and Strength:  (* denotes performed with pain)  Trunk ROM MMT (-/5)     Flex WNL NT     Ext WNL - mild lumbar pain NT    R L R L     Side bend WNL WNL NT NT     Rotation WNL WNL -- (split stance transverse plane with right LE posterior position - pain)     ,   Hip   ROM MMT (-/5)    R L R L     Flex (L2) WNL WNL 5/5 5/5     Ext  WNL WNL 4/5 5/5     Abd WNL WNL 5/5 5/5     ER WNL WNL 5/5 5/5     IR WNL closed chain hip IR - right hip pain WNL 5/5 5/5    ,       Flexibility:  LE Flexibility R L     Hip Flexor min restricted min restricted     Hamstrings min restricted min restricted     ITB         Piriformis WNL WNL     Quads min restricted  min restricted     Gastroc-soleus WNL WNL       Neurological:  Sensation: grossly intact to light touch AMRIK UE/LE           Balance and Functional Mobility:  Gait: pt ambulates on level ground with normal mechanics.  Balance: SLS: EO R -- (< 10 seconds), EO L -- (< 10 seconds)          Today's Treatment and Response:   Pt education was provided on exam findings, treatment diagnosis, treatment plan, expectations, and prognosis.  Today's Treatment       5/8/2025   Spine Treatment   Therapeutic Exercise Minutes 10   Evaluation Minutes 35   Total Time Of Timed Procedures 10   Total Time Of Service-Based Procedures 35   Total Treatment Time 45   HEP Hip IR, split stance shoulder flexion on wall         Patient was instructed in and issued a HEP for: Hip IR, split stance shoulder flexion on wall     Charges:  PT EVAL: Low Complexity, Eval, therEx  In agreement with evaluation findings and clinical rationale, this evaluation involved LOW COMPLEXITY decision making due to no personal factors/comorbidities, 1-2 body structures involved/activity limitations, and stable symptoms as documented in the evaluation.                                                                         PLAN OF CARE:    Goals: (to be met in 8 visits)   Patient will report improved tolerance for standing/ambulation   No right hip pain with closed chain hip ROM  No pain with lifting minimum of 20 pounds   Single leg balance minimum of 15 seconds each      Frequency / Duration: Patient will be seen 2x/wkx/week or a total of 8  visits over a 90 day period. Treatment will include: Manual Therapy; Therapeutic Exercise; Patient/Family Education; Home Exercise Program instruction    Education or treatment limitation: None   Rehab Potential: good     Oswestry Disability Index Score  Score: (Patient-Rptd) 36 % (5/3/2025 12:42 PM)      Patient/Family/Caregiver was advised of these findings, precautions, and treatment options and has agreed to actively  participate in planning and for this course of care.    Thank you for your referral. Please co-sign or sign and return this letter via fax as soon as possible to [phone]. If you have any questions, please contact me at Dept: [phone]    Sincerely,  Electronically signed by therapist: Almita Christensen PT  Physician's certification required: Yes  I certify the need for these services furnished under this plan of treatment and while under my care.    X___________________________________________________ Date____________________    Certification From: 5/9/2025  To:8/7/2025

## 2025-05-13 ENCOUNTER — OFFICE VISIT (OUTPATIENT)
Dept: PHYSICAL THERAPY | Age: 65
End: 2025-05-13
Attending: STUDENT IN AN ORGANIZED HEALTH CARE EDUCATION/TRAINING PROGRAM
Payer: MEDICARE

## 2025-05-13 PROCEDURE — 97110 THERAPEUTIC EXERCISES: CPT | Performed by: PHYSICAL THERAPIST

## 2025-05-13 NOTE — PROGRESS NOTES
Patient: Keith Winslow (65 year old, male) Referring Provider:  Insurance:   Diagnosis: Spinal stenosis of lumbar region with neurogenic claudication (M48.062) Gavino Nava  [employer]   Date of Surgery: NA Next MD visit:  N/A   Precautions:  Drug Allergy No data recorded Referral Information:    Date of Evaluation: Req: 8, Auth: 8, Exp: 6/12/2025 05/08/25 POC Auth Visits:          Today's Date   5/13/2025    Subjective  left hamstring/hip pain       Pain: 5/10     Objective  Transient episodes of left side back/hip pain. Pain elminated in quadruped with  hip hinge rocking. Pain left side provoked with right hip flexion and at times during position changes            Assessment  pain appeears to be most notable with  spinal flexion and with transition lordosis during changes back and forth between flex/exten. Cervical flexion appeared to have some impact on pain.    Goals (to be met in 8 visits)   Patient will report improved tolerance for standing/ambulation   No right hip pain with closed chain hip ROM  No pain with lifting minimum of 20 pounds   Single leg balance minimum of 15 seconds each        Plan  Assess cervical spine and lift/carry    Treatment Last 4 Visits  Treatment Day: 2       5/8/2025 5/13/2025   Spine Treatment   Therapeutic Exercise  Bike L5 x 6 min   TRX squats - pain left side with lumbar reversal  Transverse plane isometrics standing - left side lumbar pain   Quadruped rock with hip hinge - good  Bent knee fallouts > 10 reps each   Leg load 5 reps   SLR 5 reps each   Prone quad stretch 4 reps x 30 seconds each  Prone press up decreased pain   Slumped sitting pain with transition to and from lordotic to slumped position    Therapeutic Exercise Minutes 10 45   Evaluation Minutes 35    Total Time Of Timed Procedures 10 45   Total Time Of Service-Based Procedures 35 0   Total Treatment Time 45 45   HEP Hip IR, split stance shoulder flexion on wall          HEP  Hip IR, split  stance shoulder flexion on wall     Charges  TherEx 3

## 2025-05-15 ENCOUNTER — OFFICE VISIT (OUTPATIENT)
Dept: PHYSICAL THERAPY | Age: 65
End: 2025-05-15
Attending: STUDENT IN AN ORGANIZED HEALTH CARE EDUCATION/TRAINING PROGRAM
Payer: MEDICARE

## 2025-05-15 PROCEDURE — 97110 THERAPEUTIC EXERCISES: CPT | Performed by: PHYSICAL THERAPIST

## 2025-05-16 NOTE — PROGRESS NOTES
Patient: Keith Winslow (65 year old, male) Referring Provider:  Insurance:   Diagnosis: Spinal stenosis of lumbar region with neurogenic claudication (M48.062) Gavino Nava  [employer]   Date of Surgery: NA Next MD visit:  N/A   Precautions:  Drug Allergy No data recorded Referral Information:    Date of Evaluation: Req: 8, Auth: 8, Exp: 6/12/2025 05/08/25 POC Auth Visits:          Today's Date   5/15/2025    Subjective  Pain at times in both legs       Pain: 0/10 (Pain as high as 5/10 while at work)     Objective  SLR - negative. Slumped sitting - provoked left hamstring pain. Gait on treadmill decreased with backward walk- symptoms still present. No symptoms with quadruped rocking. Transient LE symptoms with squatting, position change            Assessment  Posture impacts LE symptoms. Slumped sitting> symptoms overall. Today's visit, symptoms were more transient versus constant.  Patient to walk over the weekend as he will not be working. He is to monitor symptoms    Goals (to be met in 8 visits)   Patient will report improved tolerance for standing/ambulation   No right hip pain with closed chain hip ROM  No pain with lifting minimum of 20 pounds   Single leg balance minimum of 15 seconds each          Plan  PT 2x/wk focus on stability/postural training next visit    Treatment Last 4 Visits  Treatment Day: 3       5/8/2025 5/13/2025 5/15/2025   Spine Treatment   Therapeutic Exercise  Bike L5 x 6 min   TRX squats - pain left side with lumbar reversal  Transverse plane isometrics standing - left side lumbar pain   Quadruped rock with hip hinge - good  Bent knee fallouts > 10 reps each   Leg load 5 reps   SLR 5 reps each   Prone quad stretch 4 reps x 30 seconds each  Prone press up decreased pain   Slumped sitting pain with transition to and from lordotic to slumped position  Treadmill back and forward - decreased symptoms backward versus forward < 8 min total  Medicordz forward walk - good    Supine right and left hamstring stretch 3 x 20 sec each   SL thoracic mobility 12 reps each   Quadruped rock with hip hinge   TRX squats - transient episodes of LE symptoms   Cervical PROM in supine - no changes    Therapeutic Exercise Minutes 10 45 40   Evaluation Minutes 35     Total Time Of Timed Procedures 10 45 40   Total Time Of Service-Based Procedures 35 0 0   Total Treatment Time 45 45 40   HEP Hip IR, split stance shoulder flexion on wall           HEP  Hip IR, split stance shoulder flexion on wall     Charges  TherEx 3

## 2025-05-20 ENCOUNTER — OFFICE VISIT (OUTPATIENT)
Dept: PHYSICAL THERAPY | Age: 65
End: 2025-05-20
Attending: STUDENT IN AN ORGANIZED HEALTH CARE EDUCATION/TRAINING PROGRAM
Payer: MEDICARE

## 2025-05-20 PROCEDURE — 97530 THERAPEUTIC ACTIVITIES: CPT | Performed by: PHYSICAL THERAPIST

## 2025-05-20 PROCEDURE — 97110 THERAPEUTIC EXERCISES: CPT | Performed by: PHYSICAL THERAPIST

## 2025-05-20 NOTE — PROGRESS NOTES
Patient: Keith Winslow (65 year old, male) Referring Provider:  Insurance:   Diagnosis: Spinal stenosis of lumbar region with neurogenic claudication (M48.062) Gavino Nava  [employer]   Date of Surgery: NA Next MD visit:  N/A   Precautions:  Drug Allergy No data recorded Referral Information:    Date of Evaluation: Req: 8, Auth: 8, Exp: 6/12/2025 05/08/25 POC Auth Visits:          Today's Date   5/20/2025    Subjective  Patient states he was able to walk over the weekend. He had no pain. Pain starts when he gets to work and leans on the desk while standing       Pain: 0/10     Objective  Treatment session included additional patient education on posture. Patient simulated stance utilized at work, weight shift to left included lateral shift of pelvis provoked pain. Postural correction reduces pain. Left SLR - > 45 degrees.            Assessment  Patient was able to appreciate impact his posture is having on pain. Recommended he trial sitting at work versus standing with forward lean and weight shift left. he will monitor response    Goals (to be met in 8 visits)   Patient will report improved tolerance for standing/ambulation   No right hip pain with closed chain hip ROM  No pain with lifting minimum of 20 pounds   Single leg balance minimum of 15 seconds each            Plan  Continue PT with progressive exercise and education    Treatment Last 4 Visits  Treatment Day: 4       5/8/2025 5/13/2025 5/15/2025 5/20/2025   Spine Treatment   Therapeutic Exercise  Bike L5 x 6 min   TRX squats - pain left side with lumbar reversal  Transverse plane isometrics standing - left side lumbar pain   Quadruped rock with hip hinge - good  Bent knee fallouts > 10 reps each   Leg load 5 reps   SLR 5 reps each   Prone quad stretch 4 reps x 30 seconds each  Prone press up decreased pain   Slumped sitting pain with transition to and from lordotic to slumped position  Treadmill back and forward - decreased symptoms  backward versus forward < 8 min total  Medicordz forward walk - good   Supine right and left hamstring stretch 3 x 20 sec each   SL thoracic mobility 12 reps each   Quadruped rock with hip hinge   TRX squats - transient episodes of LE symptoms   Cervical PROM in supine - no changes  Forward step bilat reach to knee level 5 reps each, head down 5 reps each - pain with right LE loading left side pain   Single leg balance with opposite leg swings UE support 15 reps each   Lateral step up/down on 8 inch step 10 reps each   Cervical PROM - no symptoms - ROM WNL  SL thoracic mobility 10 reps each   Rhythmic stabilization - contact pelvis/shoulders - mild pain when performed too rapidly   Side step with superband - mild pain moving left - square pelvic reduced pain   Cervical isometrics - no pain   Therapeutic Activity    Patient simulation of posture when standing at work. There is lateral shift of pelvis left with WB left LE. Upright standing, bilat knee flexion provoke symptoms left LE.Tactile cues for full hip/knee extension eliminated left LE symptoms    Therapeutic Exercise Minutes 10 45 40 35   Therapeutic Activity Minutes    10   Evaluation Minutes 35      Total Time Of Timed Procedures 10 45 40 45   Total Time Of Service-Based Procedures 35 0 0 0   Total Treatment Time 45 45 40 45   HEP Hip IR, split stance shoulder flexion on wall            HEP  Hip IR, split stance shoulder flexion on wall     Charges  TherEx 2, therapeutic activities

## 2025-05-22 ENCOUNTER — OFFICE VISIT (OUTPATIENT)
Dept: PHYSICAL THERAPY | Age: 65
End: 2025-05-22
Attending: STUDENT IN AN ORGANIZED HEALTH CARE EDUCATION/TRAINING PROGRAM
Payer: MEDICARE

## 2025-05-22 PROCEDURE — 97110 THERAPEUTIC EXERCISES: CPT | Performed by: PHYSICAL THERAPIST

## 2025-05-23 NOTE — PROGRESS NOTES
Patient: Keith Winslow (65 year old, male) Referring Provider:  Insurance:   Diagnosis: Spinal stenosis of lumbar region with neurogenic claudication (M48.062) Gavino Nava  [employer]   Date of Surgery: NA Next MD visit:  N/A   Precautions:  Drug Allergy No data recorded Referral Information:    Date of Evaluation: Req: 8, Auth: 8, Exp: 6/12/2025 05/08/25 POC Auth Visits:          Today's Date   5/22/2025    Subjective  Reports he is paying more attention to his posture at work       Pain: 0/10     Objective  Mild right LE pain with diagonal lifts 7 pounds. No pain with forward/lat  step matrix 10 pound medicine ball            Assessment  Right LE symptoms were transient during the visit. No pain at completion of the visit    Goals (to be met in 8 visits)   Patient will report improved tolerance for standing/ambulation   No right hip pain with closed chain hip ROM  No pain with lifting minimum of 20 pounds   Single leg balance minimum of 15 seconds each              Plan  Continue PT 3 more visits    Treatment Last 4 Visits  Treatment Day: 5       5/13/2025 5/15/2025 5/20/2025 5/22/2025   Spine Treatment   Therapeutic Exercise Bike L5 x 6 min   TRX squats - pain left side with lumbar reversal  Transverse plane isometrics standing - left side lumbar pain   Quadruped rock with hip hinge - good  Bent knee fallouts > 10 reps each   Leg load 5 reps   SLR 5 reps each   Prone quad stretch 4 reps x 30 seconds each  Prone press up decreased pain   Slumped sitting pain with transition to and from lordotic to slumped position  Treadmill back and forward - decreased symptoms backward versus forward < 8 min total  Medicordz forward walk - good   Supine right and left hamstring stretch 3 x 20 sec each   SL thoracic mobility 12 reps each   Quadruped rock with hip hinge   TRX squats - transient episodes of LE symptoms   Cervical PROM in supine - no changes  Forward step bilat reach to knee level 5 reps each,  head down 5 reps each - pain with right LE loading left side pain   Single leg balance with opposite leg swings UE support 15 reps each   Lateral step up/down on 8 inch step 10 reps each   Cervical PROM - no symptoms - ROM WNL  SL thoracic mobility 10 reps each   Rhythmic stabilization - contact pelvis/shoulders - mild pain when performed too rapidly   Side step with superband - mild pain moving left - square pelvic reduced pain   Cervical isometrics - no pain Elliptical - symptoms, Nustep L8 x 6 min   TRX retraction > 15 reps transient right LE symptoms   Bilat pulldown 84 pounds standing 10 reps x 3   Cable chop 60 pounds 15 reps each   8 and 10 pound medicine ball lateral and forward step 5 reps overhead press   7 pounds each diagonal lift - right LE symptoms, no weight - no symptoms   Body blade 15 seconds x 3 bilat hold   Hamstring/calf dynamic stretch right and left LE   Knees to chest with SB 30 reps       Therapeutic Activity   Patient simulation of posture when standing at work. There is lateral shift of pelvis left with WB left LE. Upright standing, bilat knee flexion provoke symptoms left LE.Tactile cues for full hip/knee extension eliminated left LE symptoms     Therapeutic Exercise Minutes 45 40 35 45   Therapeutic Activity Minutes   10    Total Time Of Timed Procedures 45 40 45 45   Total Time Of Service-Based Procedures 0 0 0 0   Total Treatment Time 45 40 45 45        HEP  Hip IR, split stance shoulder flexion on wall     Charges  TherEx 3

## 2025-05-29 NOTE — PROGRESS NOTES
Chief Complaint:   Chief Complaint   Patient presents with    Follow - Up     6 month follow-up    Hypertension     Blood pressure follow-up     HPI:   This is a 64 year old male         HYPERTENSION  On Losartan and hydrochlorothiazide   Compliant with meds  No home BP check  No Cough  Currently following up with cardiology for Sx of SOB with exertion  Compliant with meds  No cough  Home BP checks  normotensive  Non smoker quit 2 years ago        PMS       Past Medical History:   Diagnosis Date    Sleep apnea     Unspecified essential hypertension      Past Surgical History:   Procedure Laterality Date    EYE SURGERY      FLUOR GID & LOCLZJ NDL/CATH SPI DX/THER NJX  6/26/2014    Procedure: ;  Surgeon: Aleksandr Rendon MD;  Location: [facility]    FLUOR GID & LOCLZJ NDL/CATH SPI DX/THER NJX N/A 7/17/2014    Procedure: CERVICAL EPIDURAL;  Surgeon: Aleksandr Rendon MD;  Location: [facility]    FLUOR GID & LOCLZJ NDL/CATH SPI DX/THER NJX N/A 8/7/2014    Procedure: CERVICAL EPIDURAL;  Surgeon: Aleksandr Rendon MD;  Location: [facility]    INJECTION, W/WO CONTRAST, DX/THERAPEUTIC SUBSTANCE, EPIDURAL/SUBARACHNOID; CERVICAL/THORACIC N/A 6/26/2014    Procedure: CERVICAL EPIDURAL;  Surgeon: Aleksandr Rendon MD;  Location: [facility]    INJECTION, W/WO CONTRAST, DX/THERAPEUTIC SUBSTANCE, EPIDURAL/SUBARACHNOID; CERVICAL/THORACIC N/A 7/17/2014    Procedure: CERVICAL EPIDURAL;  Surgeon: Aleksandr Rendon MD;  Location: [facility]    INJECTION, W/WO CONTRAST, DX/THERAPEUTIC SUBSTANCE, EPIDURAL/SUBARACHNOID; CERVICAL/THORACIC N/A 8/7/2014    Procedure: CERVICAL EPIDURAL;  Surgeon: Aleksandr Rendon MD;  Location: [facility]    REPAIR ROTATOR CUFF,ACUTE       Social History:  Social History     Socioeconomic History    Marital status:    Tobacco Use    Smoking status: Former    Smokeless tobacco: Never   Vaping  Use    Vaping Use: Never used   Substance and Sexual Activity    Alcohol use: Yes     Comment: occ    Drug use: No     Family History:  Family History   Problem Relation Age of Onset    Hypertension Mother      Allergies:  No Known Allergies  Current Meds:  Current Outpatient Medications on File Prior to Visit   Medication Sig Dispense Refill    clobetasol 0.05 % External Ointment Apply 1 Application topically 2 (two) times daily as needed. APPLY TO AFFECTED AREA      LOSARTAN 100 MG Oral Tab TAKE 1 TABLET(100 MG) BY MOUTH DAILY 90 tablet 1    HYDROCHLOROTHIAZIDE 25 MG Oral Tab TAKE 1 TABLET(25 MG) BY MOUTH DAILY 90 tablet 1    Sildenafil Citrate 100 MG Oral Tab Take 1 tablet (100 mg total) by mouth daily as needed for Erectile Dysfunction. Take ~ one hour prior to sexual activity on an empty stomach 30 tablet 5    tamsulosin 0.4 MG Oral Cap Take 1 capsule (0.4 mg total) by mouth daily. 90 capsule 1    triamcinolone 0.5 % External Cream APPLY TOPICALLY TO THE AFFECTED AREA TWICE DAILY. DO NOT USE FOR NO MORE THAN 14 DAYS CONTINUOUSLY 30 g 2    atorvastatin 10 MG Oral Tab Take 1 tablet (10 mg total) by mouth.      Multiple Vitamin (MULTI-VITAMIN DAILY) Oral Tab Take by mouth.      metoprolol tartrate 50 MG Oral Tab Take 1 tablet (50 mg total) by mouth. (Patient not taking: Reported on 4/4/2024)       No current facility-administered medications on file prior to visit.      Counseling given: Not Answered         PROBLEM LIST     Patient Active Problem List   Diagnosis    Upper extremity weakness    Cervical spondylosis    Spinal stenosis of cervical region    DDD (degenerative disc disease), cervical    Plantar fasciitis, bilateral    Cubital tunnel syndrome on left    Eczema    Essential hypertension    Tobacco abuse counseling    Tobacco abuse    Mixed hyperlipidemia    Rash and nonspecific skin eruption    Sialadenitis    Prostate hypertrophy         REVIEW OF SYSTEMS:   Review of systems significant for shortness  of breath  The rest of the review of systems is negative except those stated as above    PHYSICAL EXAM:   /78   Pulse 87   Resp 19   Ht 6' 1\" (1.854 m)   Wt 209 lb 4 oz (94.9 kg)   SpO2 97%   BMI 27.61 kg/m²  Estimated body mass index is 27.61 kg/m² as calculated from the following:    Height as of this encounter: 6' 1\" (1.854 m).    Weight as of this encounter: 209 lb 4 oz (94.9 kg).   Vital signs reviewed.Appears stated age, well groomed.  GENERAL: well developed, well nourished, well hydrated, no distress  SKIN: good skin turgor, no obvious rashes  HEENT: atraumatic, normocephalic, ears, nose and throat are clear  EYES: sclera non icteric bilateral  NECK: supple, no adenopathy, no thyromegaly  LUNGS: clear to auscultation, no RRW  CARDIO: RRR without murmur  EXTREMITIES: no cyanosis, clubbing or edema  No results found for this or any previous visit (from the past 672 hour(s)).        ASSESSMENT AND PLAN:         1. Renal insufficiency  - Basic Metabolic Panel (8); Future  Will recheck.    2. Essential hypertension  Stable continue with current medications    3. Hypercholesterolemia  Stable continue with atorvastatin.    4. Dyspnea on exertion  Has been worked up by cardiology.  Continue workup with cardiology.  If all workup cardiac wise is negative may consider workup pulmonary wise rule out COPD.      PATIENT INSTRUCTIONS:    Continue follow up cardiology  Continue with all medications  Have blood test done to recheck creatinine  Follow up in sept for annual wellness      FOLLOW UP: Sept for EIR               REVIEW OF SYSTEMS:    CONSTITUTIONAL: No weakness, fevers or chills  EYES/ENT: No visual changes;  No vertigo or throat pain   NECK: No pain or stiffness  RESPIRATORY: No cough, wheezing, hemoptysis; No shortness of breath  CARDIOVASCULAR: No chest pain or palpitations  GASTROINTESTINAL: No abdominal or epigastric pain. No nausea, vomiting, or hematemesis; No diarrhea or constipation. No melena or hematochezia.  GENITOURINARY: No dysuria, frequency or hematuria  NEUROLOGICAL: No numbness or weakness  SKIN: No itching, burning, rashes, or lesions   All other review of systems is negative unless indicated above.

## 2025-06-04 ENCOUNTER — OFFICE VISIT (OUTPATIENT)
Dept: PHYSICAL THERAPY | Age: 65
End: 2025-06-04
Attending: STUDENT IN AN ORGANIZED HEALTH CARE EDUCATION/TRAINING PROGRAM
Payer: MEDICARE

## 2025-06-04 PROCEDURE — 97110 THERAPEUTIC EXERCISES: CPT | Performed by: PHYSICAL THERAPIST

## 2025-06-05 NOTE — PROGRESS NOTES
Patient: Keith Winslow (65 year old, male) Referring Provider:  Insurance:   Diagnosis: Spinal stenosis of lumbar region with neurogenic claudication (M48.062) Gavino Nava  [employer]   Date of Surgery: NA Next MD visit:  N/A   Precautions:  Drug Allergy No data recorded Referral Information:    Date of Evaluation: Req: 8, Auth: 8, Exp: 6/12/2025 05/08/25 POC Auth Visits:        Progress Summary  Pt has attended 6 visits in Physical Therapy.    Today's Date   6/4/2025    Subjective  left hamstring pain standing at work at times  No back right side pain       Pain: 0/10     Objective  Gait: No deviation  Spinal ROM WNL - no pain   Bilateral LE strength/ROM WNL   Demonstrates overhead lift minimum of 10 pounds with no pain.  Lift/carry of 25 pounds unilateral carry left side - mild hamstring pain. No pain with bilat carry   Palpation: no pain . Neural tension tests - negative           Assessment  Patient is doing well overall. Right side lumbar pain has resolved. Left side hamstring pain was provoked with unilateral weight carry. No pain with bilat carry. Patient is aware of body mechanics for lifting/postural correction. There are no strength/ROM deficits to report    Goals (to be met in 8 visits)   Patient will report improved tolerance for standing/ambulation   No right hip pain with closed chain hip ROM  No pain with lifting minimum of 20 pounds   Single leg balance minimum of 15 seconds each                    Post Oswestry Disability Index Score  Post Score: 6 % (6/5/2025 11:20 AM)    30 % improvement    Plan: Discontinue PT         Thank you for your referral. If you have any questions, please contact me at Dept: [phone].    Sincerely,  Electronically signed by therapist: Almita Christensen PT     Physician's certification required:  No  Please co-sign or sign and return this letter via fax as soon as possible to [phone].   I certify the need for these services furnished under this plan  of treatment and while under my care.    X___________________________________________________ Date____________________    Certification From: 6/5/2025  To:9/3/2025    Patient/Family/Caregiver was advised of these findings, precautions, and treatment options and has agreed to actively participate in planning and for this course of care.    Thank you for your referral. If you have any questions, please contact me at Dept: [phone].    Sincerely,  Electronically signed by therapist: Almita Christensen PT     Physician's certification required:  Yes  Please co-sign or sign and return this letter via fax as soon as possible to [phone].   I certify the need for these services furnished under this plan of treatment and while under my care.    X___________________________________________________ Date____________________    Certification From: 6/4/2025  To:9/2/2025         Treatment Last 4 Visits  Treatment Day: 6       5/15/2025 5/20/2025 5/22/2025 6/4/2025   Spine Treatment   Therapeutic Exercise Treadmill back and forward - decreased symptoms backward versus forward < 8 min total  Medicordz forward walk - good   Supine right and left hamstring stretch 3 x 20 sec each   SL thoracic mobility 12 reps each   Quadruped rock with hip hinge   TRX squats - transient episodes of LE symptoms   Cervical PROM in supine - no changes  Forward step bilat reach to knee level 5 reps each, head down 5 reps each - pain with right LE loading left side pain   Single leg balance with opposite leg swings UE support 15 reps each   Lateral step up/down on 8 inch step 10 reps each   Cervical PROM - no symptoms - ROM WNL  SL thoracic mobility 10 reps each   Rhythmic stabilization - contact pelvis/shoulders - mild pain when performed too rapidly   Side step with superband - mild pain moving left - square pelvic reduced pain   Cervical isometrics - no pain Elliptical - symptoms, Nustep L8 x 6 min   TRX retraction > 15 reps transient right LE  symptoms   Bilat pulldown 84 pounds standing 10 reps x 3   Cable chop 60 pounds 15 reps each   8 and 10 pound medicine ball lateral and forward step 5 reps overhead press   7 pounds each diagonal lift - right LE symptoms, no weight - no symptoms   Body blade 15 seconds x 3 bilat hold   Hamstring/calf dynamic stretch right and left LE   Knees to chest with SB 30 reps     5 min treadmill  Rhythmic stab pelvis/shoulders, pelvis, shoulders - good   LTR 10 reps   SL thoracic mobility 10 reps   TRX squats 20 reps   Overhead lift 8 and 10 medicine ball > 10 reps each no difficulty   25 pound suitcase carry left side - left leg pain. Bilat carry - no pain   Hamstring stretch/hip mobility on step      Therapeutic Activity  Patient simulation of posture when standing at work. There is lateral shift of pelvis left with WB left LE. Upright standing, bilat knee flexion provoke symptoms left LE.Tactile cues for full hip/knee extension eliminated left LE symptoms      Therapeutic Exercise Minutes 40 35 45 45   Therapeutic Activity Minutes  10     Total Time Of Timed Procedures 40 45 45 45   Total Time Of Service-Based Procedures 0 0 0 0   Total Treatment Time 40 45 45 45        HEP  Hip IR, split stance shoulder flexion on wall     Charges  TherEx 3

## 2025-07-13 DIAGNOSIS — M54.50 ACUTE RIGHT-SIDED LOW BACK PAIN WITHOUT SCIATICA: ICD-10-CM

## 2025-07-15 NOTE — TELEPHONE ENCOUNTER
Please review; protocol failed/ has no protocol      Recent fills: 03/20/2025,03/04/2025,02/17/2025  Last Rx written: 03/19/2025  Last Office Visit: 03/18/2025    Recent Visits  Date Type Provider Dept   03/18/25 [facility] Visit [facility]     Future Appointments  Date Type Provider Dept   07/17/25 Appointment [facility] Hackett MD [facility]   Showing future appointments within next 150 days with a meds authorizing provider and meeting all other requirements         Medical resident.

## 2025-07-17 ENCOUNTER — OFFICE VISIT (OUTPATIENT)
Dept: FAMILY MEDICINE CLINIC | Facility: CLINIC | Age: 65
End: 2025-07-17
Payer: COMMERCIAL

## 2025-07-17 VITALS
RESPIRATION RATE: 18 BRPM | OXYGEN SATURATION: 96 % | WEIGHT: 221 LBS | DIASTOLIC BLOOD PRESSURE: 68 MMHG | HEIGHT: 73 IN | HEART RATE: 95 BPM | SYSTOLIC BLOOD PRESSURE: 116 MMHG | BODY MASS INDEX: 29.29 KG/M2

## 2025-07-17 DIAGNOSIS — I10 ESSENTIAL HYPERTENSION: Primary | ICD-10-CM

## 2025-07-17 DIAGNOSIS — E78.00 HYPERCHOLESTEROLEMIA: ICD-10-CM

## 2025-07-17 DIAGNOSIS — Z23 NEED FOR SHINGLES VACCINE: ICD-10-CM

## 2025-07-17 DIAGNOSIS — K63.5 POLYP OF COLON, UNSPECIFIED PART OF COLON, UNSPECIFIED TYPE: ICD-10-CM

## 2025-07-17 DIAGNOSIS — M54.50 ACUTE RIGHT-SIDED LOW BACK PAIN WITHOUT SCIATICA: ICD-10-CM

## 2025-07-17 PROCEDURE — 1159F MED LIST DOCD IN RCRD: CPT | Performed by: EMERGENCY MEDICINE

## 2025-07-17 PROCEDURE — 99214 OFFICE O/P EST MOD 30 MIN: CPT | Performed by: EMERGENCY MEDICINE

## 2025-07-17 PROCEDURE — 3074F SYST BP LT 130 MM HG: CPT | Performed by: EMERGENCY MEDICINE

## 2025-07-17 PROCEDURE — 3078F DIAST BP <80 MM HG: CPT | Performed by: EMERGENCY MEDICINE

## 2025-07-17 PROCEDURE — 1160F RVW MEDS BY RX/DR IN RCRD: CPT | Performed by: EMERGENCY MEDICINE

## 2025-07-17 PROCEDURE — G2211 COMPLEX E/M VISIT ADD ON: HCPCS | Performed by: EMERGENCY MEDICINE

## 2025-07-17 PROCEDURE — 3008F BODY MASS INDEX DOCD: CPT | Performed by: EMERGENCY MEDICINE

## 2025-07-17 RX ORDER — ACETAMINOPHEN AND CODEINE PHOSPHATE 300; 30 MG/1; MG/1
1-2 TABLET ORAL EVERY 6 HOURS PRN
Qty: 30 TABLET | Refills: 0 | Status: SHIPPED | OUTPATIENT
Start: 2025-07-17

## 2025-07-17 RX ORDER — HYDROCHLOROTHIAZIDE 25 MG/1
25 TABLET ORAL DAILY
Qty: 90 TABLET | Refills: 1 | Status: SHIPPED | OUTPATIENT
Start: 2025-07-17

## 2025-07-17 RX ORDER — ATORVASTATIN CALCIUM 10 MG/1
10 TABLET, FILM COATED ORAL NIGHTLY
Qty: 90 TABLET | Refills: 1 | Status: SHIPPED | OUTPATIENT
Start: 2025-07-17

## 2025-07-17 RX ORDER — ACETAMINOPHEN AND CODEINE PHOSPHATE 300; 30 MG/1; MG/1
1-2 TABLET ORAL EVERY 6 HOURS PRN
Qty: 30 TABLET | Refills: 0 | OUTPATIENT
Start: 2025-07-17

## 2025-07-17 NOTE — PROGRESS NOTES
The following individual(s) verbally consented to be recorded using ambient AI listening technology and understand that they can each withdraw their consent to this listening technology at any point by asking the clinician to turn off or pause the recording:    Patient name: Keith Winslow  Additional names:

## 2025-07-17 NOTE — PATIENT INSTRUCTIONS
Thank you for choosing [facility]  To Do:  FOR PUJA BRADLEY    Use over the counter complresison socks 15-20 mmHg  Try PROCOMPRESSION brand  Have blood tests done  Continue blood pressure medications  Check Blood pressure regularly 1-2 x a week  BP goal is <140/90  Follow up in 6 months Jan 2026  Arrange for Colonoscopy Dec 2025

## 2025-07-26 ENCOUNTER — LAB ENCOUNTER (OUTPATIENT)
Dept: LAB | Age: 65
End: 2025-07-26
Attending: EMERGENCY MEDICINE
Payer: MEDICARE

## 2025-07-26 DIAGNOSIS — E78.00 HYPERCHOLESTEROLEMIA: ICD-10-CM

## 2025-07-26 DIAGNOSIS — D50.9 IRON DEFICIENCY ANEMIA, UNSPECIFIED IRON DEFICIENCY ANEMIA TYPE: ICD-10-CM

## 2025-07-26 LAB
ALBUMIN SERPL-MCNC: 4.4 G/DL (ref 3.2–4.8)
ALBUMIN/GLOB SERPL: 1.7 {RATIO} (ref 1–2)
ALP LIVER SERPL-CCNC: 79 U/L (ref 45–117)
ALT SERPL-CCNC: 24 U/L (ref 10–49)
ANION GAP SERPL CALC-SCNC: 11 MMOL/L (ref 0–18)
AST SERPL-CCNC: 29 U/L (ref ?–34)
BASOPHILS # BLD AUTO: 0.06 X10(3) UL (ref 0–0.2)
BASOPHILS NFR BLD AUTO: 0.9 %
BILIRUB SERPL-MCNC: 0.8 MG/DL (ref 0.2–1.1)
BUN BLD-MCNC: 7 MG/DL (ref 9–23)
CALCIUM BLD-MCNC: 9.7 MG/DL (ref 8.7–10.6)
CHLORIDE SERPL-SCNC: 102 MMOL/L (ref 98–112)
CHOLEST SERPL-MCNC: 146 MG/DL (ref ?–200)
CO2 SERPL-SCNC: 29 MMOL/L (ref 21–32)
CREAT BLD-MCNC: 1 MG/DL (ref 0.7–1.3)
DEPRECATED HBV CORE AB SER IA-ACNC: 26 NG/ML (ref 50–336)
EGFRCR SERPLBLD CKD-EPI 2021: 82 ML/MIN/1.73M2 (ref 60–?)
EOSINOPHIL # BLD AUTO: 0.18 X10(3) UL (ref 0–0.7)
EOSINOPHIL NFR BLD AUTO: 2.8 %
ERYTHROCYTE [DISTWIDTH] IN BLOOD BY AUTOMATED COUNT: 13.2 %
FASTING PATIENT LIPID ANSWER: YES
FASTING STATUS PATIENT QL REPORTED: YES
GLOBULIN PLAS-MCNC: 2.6 G/DL (ref 2–3.5)
GLUCOSE BLD-MCNC: 109 MG/DL (ref 70–99)
HCT VFR BLD AUTO: 42.2 % (ref 39–53)
HDLC SERPL-MCNC: 44 MG/DL (ref 40–59)
HGB BLD-MCNC: 13.9 G/DL (ref 13–17.5)
IMM GRANULOCYTES # BLD AUTO: 0.02 X10(3) UL (ref 0–1)
IMM GRANULOCYTES NFR BLD: 0.3 %
LDLC SERPL CALC-MCNC: 82 MG/DL (ref ?–100)
LYMPHOCYTES # BLD AUTO: 1.71 X10(3) UL (ref 1–4)
LYMPHOCYTES NFR BLD AUTO: 26.4 %
MCH RBC QN AUTO: 29.4 PG (ref 26–34)
MCHC RBC AUTO-ENTMCNC: 32.9 G/DL (ref 31–37)
MCV RBC AUTO: 89.2 FL (ref 80–100)
MONOCYTES # BLD AUTO: 0.64 X10(3) UL (ref 0.1–1)
MONOCYTES NFR BLD AUTO: 9.9 %
NEUTROPHILS # BLD AUTO: 3.87 X10 (3) UL (ref 1.5–7.7)
NEUTROPHILS # BLD AUTO: 3.87 X10(3) UL (ref 1.5–7.7)
NEUTROPHILS NFR BLD AUTO: 59.7 %
NONHDLC SERPL-MCNC: 102 MG/DL (ref ?–130)
OSMOLALITY SERPL CALC.SUM OF ELEC: 293 MOSM/KG (ref 275–295)
PLATELET # BLD AUTO: 276 10(3)UL (ref 150–450)
POTASSIUM SERPL-SCNC: 3.3 MMOL/L (ref 3.5–5.1)
PROT SERPL-MCNC: 7 G/DL (ref 5.7–8.2)
RBC # BLD AUTO: 4.73 X10(6)UL (ref 3.8–5.8)
SODIUM SERPL-SCNC: 142 MMOL/L (ref 136–145)
TRIGL SERPL-MCNC: 111 MG/DL (ref 30–149)
VLDLC SERPL CALC-MCNC: 17 MG/DL (ref 0–30)
WBC # BLD AUTO: 6.5 X10(3) UL (ref 4–11)

## 2025-07-26 PROCEDURE — 36415 COLL VENOUS BLD VENIPUNCTURE: CPT

## 2025-07-26 PROCEDURE — 85025 COMPLETE CBC W/AUTO DIFF WBC: CPT

## 2025-07-26 PROCEDURE — 80061 LIPID PANEL: CPT

## 2025-07-26 PROCEDURE — 80053 COMPREHEN METABOLIC PANEL: CPT

## 2025-07-26 PROCEDURE — 82728 ASSAY OF FERRITIN: CPT

## 2025-08-07 ENCOUNTER — HOSPITAL ENCOUNTER (OUTPATIENT)
Dept: CT IMAGING | Age: 65
Discharge: HOME | End: 2025-08-07
Attending: STUDENT IN AN ORGANIZED HEALTH CARE EDUCATION/TRAINING PROGRAM

## 2025-08-07 ENCOUNTER — OFFICE VISIT (OUTPATIENT)
Facility: LOCATION | Age: 65
End: 2025-08-07

## 2025-08-07 DIAGNOSIS — K11.20 SIALADENITIS: Primary | ICD-10-CM

## 2025-08-07 DIAGNOSIS — K11.20 SIALADENITIS: ICD-10-CM

## 2025-08-07 PROCEDURE — 70492 CT SFT TSUE NCK W/O & W/DYE: CPT | Performed by: STUDENT IN AN ORGANIZED HEALTH CARE EDUCATION/TRAINING PROGRAM

## 2025-08-07 PROCEDURE — 1159F MED LIST DOCD IN RCRD: CPT | Performed by: STUDENT IN AN ORGANIZED HEALTH CARE EDUCATION/TRAINING PROGRAM

## 2025-08-07 PROCEDURE — 1160F RVW MEDS BY RX/DR IN RCRD: CPT | Performed by: STUDENT IN AN ORGANIZED HEALTH CARE EDUCATION/TRAINING PROGRAM

## 2025-08-07 PROCEDURE — 99213 OFFICE O/P EST LOW 20 MIN: CPT | Performed by: STUDENT IN AN ORGANIZED HEALTH CARE EDUCATION/TRAINING PROGRAM

## 2025-08-21 ENCOUNTER — OFFICE VISIT (OUTPATIENT)
Facility: LOCATION | Age: 65
End: 2025-08-21

## 2025-08-21 DIAGNOSIS — K11.20 SIALADENITIS: Primary | ICD-10-CM

## 2025-08-21 PROCEDURE — 99213 OFFICE O/P EST LOW 20 MIN: CPT | Performed by: STUDENT IN AN ORGANIZED HEALTH CARE EDUCATION/TRAINING PROGRAM

## 2025-08-21 PROCEDURE — 1159F MED LIST DOCD IN RCRD: CPT | Performed by: STUDENT IN AN ORGANIZED HEALTH CARE EDUCATION/TRAINING PROGRAM

## 2025-08-21 PROCEDURE — 1160F RVW MEDS BY RX/DR IN RCRD: CPT | Performed by: STUDENT IN AN ORGANIZED HEALTH CARE EDUCATION/TRAINING PROGRAM

## 2025-08-24 DIAGNOSIS — E78.00 HYPERCHOLESTEROLEMIA: ICD-10-CM

## 2025-08-28 RX ORDER — ATORVASTATIN CALCIUM 10 MG/1
10 TABLET, FILM COATED ORAL NIGHTLY
Qty: 90 TABLET | Refills: 1 | OUTPATIENT
Start: 2025-08-28

## (undated) NOTE — LETTER
To Whom It May Concern: This certifies that Ree Like was evaluated via phone call visit today. Please excuse him from work today or any missed hours of work. Do not hesitate to call with any questions or concerns.                     Sincerely,

## (undated) NOTE — LETTER
Date & Time: 10/7/2024, 9:34 AM  Patient: Keith Winslow  Encounter Provider(s):    Hunter Cassidy MD       To Whom It May Concern:    Keith Winslow was seen and treated in our department on 10/7/2024. He should not return to work until 10/08/2024 .    If you have any questions or concerns, please do not hesitate to call.        _____________________________  Physician/APC Signature

## (undated) NOTE — LETTER
Date & Time: 5/29/2023, 10:39 PM  Patient: Stephon Martinez  Encounter Provider(s):    Luís Murillo MD       To Whom It May Concern:    Caro Thomas was seen and treated in our department on 5/29/2023. He mat return to work on 5/31/2023.   If you have any questions or concerns, please do not hesitate to call.        _____________________________  Physician/APC Signature

## (undated) NOTE — LETTER
[facility] [address] [place] [address]  [address]  [facility] [address]  Dept: [phone]  Dept Fax: [phone]         October 21, 2024    Patient: Keith Winslow   YOB: 1960   Date of Visit: 10/21/2024       To Whom It May Concern:    Keith Winslow was seen and treated in our clinic on 10/21/2024. Pls excuse him from missed work for office visit          If you have any questions or concerns, please don't hesitate to call.            3:12 PM  10/21/2024       Mike Hackett MD

## (undated) NOTE — LETTER
Date: 10/14/2024    Patient Name: Keith Winslow          To Whom it may concern:    This letter has been written at the patient's request. The above patient was seen at [facility] for treatment of a medical condition.    This patient should be excused from attending work on 10/14/2024.    The patient may return to work on 10/15/2024 with no limitations.If you have any questions don't hesitate to call.         Sincerely,    Rita Gómez MD

## (undated) NOTE — LETTER
20    Patient: Jagdish Pop  : 1960 Visit date: 2020    Dear  Simone Wetzel MD    Thank you for referring Jagdish Pop to my practice. Please find my assessment and plan below.      Assessment   Essential hypertension  (primary e

## (undated) NOTE — LETTER
Date: 2/10/2025    Patient Name: Keith Winslow          To Whom it may concern:    The above patient was seen at one of the [facility] for treatment of a medical condition.    The patient should be excused from attending work from 2/6/25 to 2/11/25.        Sincerely,    Mike Hackett MD

## (undated) NOTE — LETTER
[address] [address] [address]  [address] [facility] [address]  [phone]     Patient: Elly Bond   YOB: 1960   Date of Visit: 2/13/2021     Dear Employer,        February 13, 2021    At [address] [address], we are taking s Persons infected with SARS-CoV-2 who never develop COVID-19 symptoms may discontinue isolation and other precautions 10 days after the date of their first positive RT-PCR test for SARS-CoV-2 RNA.     Persons who are asymptomatic but have been exposed, CDC r

## (undated) NOTE — LETTER
Date: 6/12/2019    Patient Name: Pamela Buck            To Whom it may concern: The above patient was seen at the [facility] for treatment of a medical condition. This patient should be excused from missing work.               [facility]

## (undated) NOTE — LETTER
Date: 7/17/2025    Patient Name: Keith Winslow          To Whom it may concern:    This letter has been written at the patient's request. The above patient was seen at [facility] 7/17/2025 for treatment of a medical condition.            Sincerely,    Mike Hackett MD

## (undated) NOTE — LETTER
Date: 4/14/2025    Patient Name: Keith Winslow          To Whom it may concern:    This letter has been written at the patient's request. The above patient was seen at [facility] for treatment of a medical condition.      Keith was seen in my office today, 4/14/25 for a medical condition.        Gavino Nava MD  Orthopedic Spine Surgeon  [facility]   [address] [place] [address], [id number] [place] [address].[email or web address]  t: [phone]   f: [phone]

## (undated) NOTE — LETTER
To Whom It May Concern:    Gary Chamberlain has been under our care. Because of this, he was seen in our office today     He may resume his usual activities, including work, on 12-4-23 with the following restrictions:    [x]  None     []    No heavy lifting (over 15 pounds) for               weeks   []    Part-time (no more than             hours per week) for               week   []  Other:        Please feel free to contact us if there are any questions.       Sincerely,      Olivia Conley MD  [facility], [address] [place] [address] [phone]

## (undated) NOTE — LETTER
Date: 3/18/2025    Patient Name: Keith Winslow          To Whom it may concern:    This letter has been written at the patient's request. The above patient was seen at [facility] for treatment of a medical condition.    This patient should be excused from attending work/school from 3/18/25 through 3/18/25.    The patient may return to work/school on 3/19/25.        Sincerely,    Garrett Castro PA-C